# Patient Record
Sex: FEMALE | Race: WHITE | NOT HISPANIC OR LATINO | Employment: OTHER | URBAN - METROPOLITAN AREA
[De-identification: names, ages, dates, MRNs, and addresses within clinical notes are randomized per-mention and may not be internally consistent; named-entity substitution may affect disease eponyms.]

---

## 2023-09-26 ENCOUNTER — TRANSCRIBE ORDERS (OUTPATIENT)
Dept: ADMINISTRATIVE | Facility: HOSPITAL | Age: 88
End: 2023-09-26
Payer: MEDICARE

## 2023-09-26 DIAGNOSIS — S06.5XAD TRAUMATIC SUBDURAL HEMATOMA, SUBSEQUENT ENCOUNTER: Primary | ICD-10-CM

## 2023-09-28 ENCOUNTER — HOSPITAL ENCOUNTER (INPATIENT)
Facility: HOSPITAL | Age: 88
LOS: 2 days | Discharge: NURSING, REHAB OR LONG-TERM CARE | DRG: 085 | End: 2023-09-30
Attending: EMERGENCY MEDICINE | Admitting: INTERNAL MEDICINE
Payer: MEDICARE

## 2023-09-28 ENCOUNTER — APPOINTMENT (OUTPATIENT)
Dept: CT IMAGING | Facility: HOSPITAL | Age: 88
DRG: 085 | End: 2023-09-28
Payer: MEDICARE

## 2023-09-28 ENCOUNTER — APPOINTMENT (OUTPATIENT)
Dept: GENERAL RADIOLOGY | Facility: HOSPITAL | Age: 88
DRG: 085 | End: 2023-09-28
Payer: MEDICARE

## 2023-09-28 DIAGNOSIS — S06.5XAA SUBDURAL HEMATOMA: Primary | ICD-10-CM

## 2023-09-28 LAB
ALBUMIN SERPL-MCNC: 3.1 G/DL (ref 3.5–5.2)
ALBUMIN/GLOB SERPL: 1.2 G/DL
ALP SERPL-CCNC: 132 U/L (ref 39–117)
ALT SERPL W P-5'-P-CCNC: 12 U/L (ref 1–33)
ANION GAP SERPL CALCULATED.3IONS-SCNC: 10 MMOL/L (ref 5–15)
APTT PPP: 27.8 SECONDS (ref 61–76.5)
AST SERPL-CCNC: 15 U/L (ref 1–32)
BASOPHILS # BLD AUTO: 0.1 10*3/MM3 (ref 0–0.2)
BASOPHILS NFR BLD AUTO: 0.8 % (ref 0–1.5)
BILIRUB SERPL-MCNC: 0.5 MG/DL (ref 0–1.2)
BUN SERPL-MCNC: 20 MG/DL (ref 8–23)
BUN/CREAT SERPL: 21.7 (ref 7–25)
CALCIUM SPEC-SCNC: 8 MG/DL (ref 8.6–10.5)
CHLORIDE SERPL-SCNC: 102 MMOL/L (ref 98–107)
CO2 SERPL-SCNC: 25 MMOL/L (ref 22–29)
CREAT SERPL-MCNC: 0.92 MG/DL (ref 0.57–1)
DEPRECATED RDW RBC AUTO: 49 FL (ref 37–54)
EGFRCR SERPLBLD CKD-EPI 2021: 60 ML/MIN/1.73
EOSINOPHIL # BLD AUTO: 0.3 10*3/MM3 (ref 0–0.4)
EOSINOPHIL NFR BLD AUTO: 2.4 % (ref 0.3–6.2)
ERYTHROCYTE [DISTWIDTH] IN BLOOD BY AUTOMATED COUNT: 15.8 % (ref 12.3–15.4)
GLOBULIN UR ELPH-MCNC: 2.6 GM/DL
GLUCOSE SERPL-MCNC: 105 MG/DL (ref 65–99)
HCT VFR BLD AUTO: 33.1 % (ref 34–46.6)
HGB BLD-MCNC: 10.6 G/DL (ref 12–15.9)
HOLD SPECIMEN: NORMAL
INR PPP: 1.12 (ref 0.93–1.1)
LYMPHOCYTES # BLD AUTO: 1.9 10*3/MM3 (ref 0.7–3.1)
LYMPHOCYTES NFR BLD AUTO: 16.3 % (ref 19.6–45.3)
MCH RBC QN AUTO: 28.2 PG (ref 26.6–33)
MCHC RBC AUTO-ENTMCNC: 31.9 G/DL (ref 31.5–35.7)
MCV RBC AUTO: 88.4 FL (ref 79–97)
MONOCYTES # BLD AUTO: 1.3 10*3/MM3 (ref 0.1–0.9)
MONOCYTES NFR BLD AUTO: 11.7 % (ref 5–12)
NEUTROPHILS NFR BLD AUTO: 68.8 % (ref 42.7–76)
NEUTROPHILS NFR BLD AUTO: 7.9 10*3/MM3 (ref 1.7–7)
NRBC BLD AUTO-RTO: 0 /100 WBC (ref 0–0.2)
NT-PROBNP SERPL-MCNC: 9405 PG/ML (ref 0–1800)
PLATELET # BLD AUTO: 225 10*3/MM3 (ref 140–450)
PMV BLD AUTO: 9.7 FL (ref 6–12)
POTASSIUM SERPL-SCNC: 4.6 MMOL/L (ref 3.5–5.2)
PROT SERPL-MCNC: 5.7 G/DL (ref 6–8.5)
PROTHROMBIN TIME: 12.1 SECONDS (ref 9.6–11.7)
RBC # BLD AUTO: 3.74 10*6/MM3 (ref 3.77–5.28)
SODIUM SERPL-SCNC: 137 MMOL/L (ref 136–145)
T4 FREE SERPL-MCNC: 1.41 NG/DL (ref 0.93–1.7)
TROPONIN T SERPL HS-MCNC: 50 NG/L
TSH SERPL DL<=0.05 MIU/L-ACNC: 2.55 UIU/ML (ref 0.27–4.2)
WBC NRBC COR # BLD: 11.5 10*3/MM3 (ref 3.4–10.8)
WHOLE BLOOD HOLD COAG: NORMAL

## 2023-09-28 PROCEDURE — 25010000002 LABETALOL 5 MG/ML SOLUTION: Performed by: EMERGENCY MEDICINE

## 2023-09-28 PROCEDURE — 83880 ASSAY OF NATRIURETIC PEPTIDE: CPT | Performed by: EMERGENCY MEDICINE

## 2023-09-28 PROCEDURE — 84439 ASSAY OF FREE THYROXINE: CPT | Performed by: EMERGENCY MEDICINE

## 2023-09-28 PROCEDURE — 93005 ELECTROCARDIOGRAM TRACING: CPT | Performed by: EMERGENCY MEDICINE

## 2023-09-28 PROCEDURE — 87641 MR-STAPH DNA AMP PROBE: CPT | Performed by: NURSE PRACTITIONER

## 2023-09-28 PROCEDURE — 25010000002 DEXAMETHASONE PER 1 MG: Performed by: NURSE PRACTITIONER

## 2023-09-28 PROCEDURE — 84443 ASSAY THYROID STIM HORMONE: CPT | Performed by: EMERGENCY MEDICINE

## 2023-09-28 PROCEDURE — 99285 EMERGENCY DEPT VISIT HI MDM: CPT

## 2023-09-28 PROCEDURE — 84484 ASSAY OF TROPONIN QUANT: CPT | Performed by: EMERGENCY MEDICINE

## 2023-09-28 PROCEDURE — 71045 X-RAY EXAM CHEST 1 VIEW: CPT

## 2023-09-28 PROCEDURE — 25010000002 LEVETRIRACETAM PER 10 MG: Performed by: NURSE PRACTITIONER

## 2023-09-28 PROCEDURE — 85610 PROTHROMBIN TIME: CPT | Performed by: EMERGENCY MEDICINE

## 2023-09-28 PROCEDURE — 70450 CT HEAD/BRAIN W/O DYE: CPT

## 2023-09-28 PROCEDURE — 85025 COMPLETE CBC W/AUTO DIFF WBC: CPT | Performed by: EMERGENCY MEDICINE

## 2023-09-28 PROCEDURE — 80053 COMPREHEN METABOLIC PANEL: CPT | Performed by: EMERGENCY MEDICINE

## 2023-09-28 PROCEDURE — 85730 THROMBOPLASTIN TIME PARTIAL: CPT | Performed by: EMERGENCY MEDICINE

## 2023-09-28 RX ORDER — HYDRALAZINE HYDROCHLORIDE 25 MG/1
50 TABLET, FILM COATED ORAL 3 TIMES DAILY
COMMUNITY

## 2023-09-28 RX ORDER — CHOLECALCIFEROL (VITAMIN D3) 125 MCG
5 CAPSULE ORAL NIGHTLY PRN
COMMUNITY

## 2023-09-28 RX ORDER — ONDANSETRON 2 MG/ML
4 INJECTION INTRAMUSCULAR; INTRAVENOUS EVERY 6 HOURS PRN
Status: DISCONTINUED | OUTPATIENT
Start: 2023-09-28 | End: 2023-09-30 | Stop reason: HOSPADM

## 2023-09-28 RX ORDER — SODIUM CHLORIDE 0.9 % (FLUSH) 0.9 %
10 SYRINGE (ML) INJECTION AS NEEDED
Status: DISCONTINUED | OUTPATIENT
Start: 2023-09-28 | End: 2023-09-30 | Stop reason: HOSPADM

## 2023-09-28 RX ORDER — FLUOROMETHOLONE 0.1 %
1 SUSPENSION, DROPS(FINAL DOSAGE FORM)(ML) OPHTHALMIC (EYE) DAILY
COMMUNITY

## 2023-09-28 RX ORDER — MECLIZINE HYDROCHLORIDE 25 MG/1
12.5 TABLET ORAL DAILY PRN
COMMUNITY

## 2023-09-28 RX ORDER — LABETALOL HYDROCHLORIDE 5 MG/ML
10 INJECTION, SOLUTION INTRAVENOUS ONCE
Status: COMPLETED | OUTPATIENT
Start: 2023-09-28 | End: 2023-09-28

## 2023-09-28 RX ORDER — NITROGLYCERIN 0.4 MG/1
0.4 TABLET SUBLINGUAL
Status: DISCONTINUED | OUTPATIENT
Start: 2023-09-28 | End: 2023-09-30 | Stop reason: HOSPADM

## 2023-09-28 RX ORDER — SODIUM CHLORIDE 9 MG/ML
40 INJECTION, SOLUTION INTRAVENOUS AS NEEDED
Status: DISCONTINUED | OUTPATIENT
Start: 2023-09-28 | End: 2023-09-30 | Stop reason: HOSPADM

## 2023-09-28 RX ORDER — CALCIUM CARBONATE 500 MG/1
1 TABLET, CHEWABLE ORAL 3 TIMES DAILY PRN
COMMUNITY

## 2023-09-28 RX ORDER — SIMETHICONE 80 MG
80 TABLET,CHEWABLE ORAL 4 TIMES DAILY PRN
COMMUNITY

## 2023-09-28 RX ORDER — IPRATROPIUM BROMIDE AND ALBUTEROL SULFATE 2.5; .5 MG/3ML; MG/3ML
3 SOLUTION RESPIRATORY (INHALATION)
Status: DISCONTINUED | OUTPATIENT
Start: 2023-09-28 | End: 2023-09-30 | Stop reason: HOSPADM

## 2023-09-28 RX ORDER — LOPERAMIDE HYDROCHLORIDE 2 MG/1
2 CAPSULE ORAL 4 TIMES DAILY PRN
COMMUNITY

## 2023-09-28 RX ORDER — ALENDRONATE SODIUM 70 MG/1
70 TABLET ORAL
COMMUNITY

## 2023-09-28 RX ORDER — DEXAMETHASONE SODIUM PHOSPHATE 4 MG/ML
4 INJECTION, SOLUTION INTRA-ARTICULAR; INTRALESIONAL; INTRAMUSCULAR; INTRAVENOUS; SOFT TISSUE EVERY 6 HOURS
Status: DISCONTINUED | OUTPATIENT
Start: 2023-09-28 | End: 2023-09-30 | Stop reason: HOSPADM

## 2023-09-28 RX ORDER — BISACODYL 5 MG/1
5 TABLET, DELAYED RELEASE ORAL DAILY PRN
Status: DISCONTINUED | OUTPATIENT
Start: 2023-09-28 | End: 2023-09-30 | Stop reason: HOSPADM

## 2023-09-28 RX ORDER — BISACODYL 10 MG
10 SUPPOSITORY, RECTAL RECTAL DAILY PRN
Status: DISCONTINUED | OUTPATIENT
Start: 2023-09-28 | End: 2023-09-30 | Stop reason: HOSPADM

## 2023-09-28 RX ORDER — LIDOCAINE 50 MG/G
1 PATCH TOPICAL EVERY 24 HOURS
COMMUNITY

## 2023-09-28 RX ORDER — DIPHENHYDRAMINE HCL 25 MG
12.5 CAPSULE ORAL 3 TIMES DAILY PRN
COMMUNITY

## 2023-09-28 RX ORDER — ACETAMINOPHEN 325 MG/1
650 TABLET ORAL EVERY 6 HOURS PRN
COMMUNITY

## 2023-09-28 RX ORDER — ONDANSETRON 4 MG/1
4 TABLET, FILM COATED ORAL EVERY 4 HOURS PRN
COMMUNITY

## 2023-09-28 RX ORDER — ONDANSETRON 2 MG/ML
4 INJECTION INTRAMUSCULAR; INTRAVENOUS ONCE
Status: DISCONTINUED | OUTPATIENT
Start: 2023-09-28 | End: 2023-09-30 | Stop reason: HOSPADM

## 2023-09-28 RX ORDER — LEVETIRACETAM 500 MG/5ML
500 INJECTION, SOLUTION, CONCENTRATE INTRAVENOUS EVERY 12 HOURS SCHEDULED
Status: DISCONTINUED | OUTPATIENT
Start: 2023-09-28 | End: 2023-09-30 | Stop reason: HOSPADM

## 2023-09-28 RX ORDER — CARVEDILOL 3.12 MG/1
3.12 TABLET ORAL 2 TIMES DAILY WITH MEALS
COMMUNITY

## 2023-09-28 RX ORDER — ALBUTEROL SULFATE 2.5 MG/3ML
2.5 SOLUTION RESPIRATORY (INHALATION) EVERY 6 HOURS PRN
COMMUNITY

## 2023-09-28 RX ORDER — ONDANSETRON 2 MG/ML
4 INJECTION INTRAMUSCULAR; INTRAVENOUS EVERY 4 HOURS PRN
COMMUNITY

## 2023-09-28 RX ORDER — DOCUSATE SODIUM 100 MG/1
100 CAPSULE, LIQUID FILLED ORAL 2 TIMES DAILY
COMMUNITY

## 2023-09-28 RX ORDER — BUDESONIDE 0.5 MG/2ML
0.5 INHALANT ORAL
Status: DISCONTINUED | OUTPATIENT
Start: 2023-09-28 | End: 2023-09-30 | Stop reason: HOSPADM

## 2023-09-28 RX ORDER — SODIUM CHLORIDE 0.9 % (FLUSH) 0.9 %
10 SYRINGE (ML) INJECTION EVERY 12 HOURS SCHEDULED
Status: DISCONTINUED | OUTPATIENT
Start: 2023-09-28 | End: 2023-09-30 | Stop reason: HOSPADM

## 2023-09-28 RX ORDER — ONDANSETRON 4 MG/1
4 TABLET, FILM COATED ORAL EVERY 6 HOURS PRN
Status: DISCONTINUED | OUTPATIENT
Start: 2023-09-28 | End: 2023-09-30 | Stop reason: HOSPADM

## 2023-09-28 RX ORDER — HYDRALAZINE HYDROCHLORIDE 50 MG/1
50 TABLET, FILM COATED ORAL 3 TIMES DAILY
COMMUNITY
End: 2023-09-28

## 2023-09-28 RX ORDER — POLYETHYLENE GLYCOL 3350 17 G/17G
17 POWDER, FOR SOLUTION ORAL DAILY PRN
Status: DISCONTINUED | OUTPATIENT
Start: 2023-09-28 | End: 2023-09-30 | Stop reason: HOSPADM

## 2023-09-28 RX ORDER — ALUMINUM HYDROXIDE, MAGNESIUM HYDROXIDE, AND DIMETHICONE 200; 20; 200 MG/5ML; MG/5ML; MG/5ML
30 SUSPENSION ORAL 3 TIMES DAILY PRN
COMMUNITY

## 2023-09-28 RX ORDER — LIDOCAINE 4 G/G
1 PATCH TOPICAL EVERY 24 HOURS
COMMUNITY
End: 2023-09-28

## 2023-09-28 RX ORDER — AMOXICILLIN 250 MG
2 CAPSULE ORAL 2 TIMES DAILY
Status: DISCONTINUED | OUTPATIENT
Start: 2023-09-28 | End: 2023-09-30 | Stop reason: HOSPADM

## 2023-09-28 RX ORDER — DIPHENHYDRAMINE HYDROCHLORIDE, ZINC ACETATE 2; .1 G/100G; G/100G
1 CREAM TOPICAL 2 TIMES DAILY PRN
COMMUNITY

## 2023-09-28 RX ORDER — BUMETANIDE 1 MG/1
1 TABLET ORAL 2 TIMES DAILY
COMMUNITY

## 2023-09-28 RX ADMIN — DEXAMETHASONE SODIUM PHOSPHATE 4 MG: 4 INJECTION, SOLUTION INTRAMUSCULAR; INTRAVENOUS at 22:15

## 2023-09-28 RX ADMIN — LEVETIRACETAM 500 MG: 100 INJECTION, SOLUTION INTRAVENOUS at 20:24

## 2023-09-28 RX ADMIN — LABETALOL HYDROCHLORIDE 10 MG: 5 INJECTION, SOLUTION INTRAVENOUS at 20:23

## 2023-09-28 RX ADMIN — Medication 10 ML: at 22:15

## 2023-09-28 NOTE — H&P
Critical Care History and Physical     Indira Mcgarry : 1934 MRN:[id number] LOS:0 ROOM:      Reason for admission: SDH (subdural hematoma)     Assessment / Plan     Subdural hematoma  -Following fall 2 weeks ago  -SDH has grown from 12 mm to 20 mm as evidenced by CT this evening  -Right to left midline shift measuring 6 mm  -BP control, SBP <160  -Given IV labetalol in ED  -Will start IV Cardene if needed  -Neurosurgery contacted by ED, will see in a.m.  -Repeat CT in a.m.  -Stat CT for any neurological changes  -IV Keppra  -IV Decadron  -Seizure precautions    Chronic obstructive pulmonary disease  -Per daughter wears 3 L O2 at bedtime  -oxygen supplementation as needed, titrate and wean to maintain oxygen saturations > 91%  -DuoNebs  -Pulmicort    Acute on chronic Diastolic Congestive Heart Failure, with preserved ejection fraction  -Last ECHO completed at [facility] on 2023 with EF 42%  -Patient on carvedilol, currently held 2/2 bradycardia  -Resume home Bumex after verified by pharmacy and clinically appropriate    Essential hypertension  -Resume home hydralazine after verified by pharmacy    Coronary artery disease  -Hold home ASA      Nutrition:   No diet orders on file     DVT prophylaxis:  No DVT prophylaxis order currently exists.     History of Present illness     Indira Mcgarry is a 88 y.o. female with PMH of COPD, CHF, with pacemaker who was recently hospitalized after a fall on 2023 in which she was diagnosed with a subdural hematoma measuring 12 mm, was sent to the hospital from her rehab facility today for syncopal episodes.  Patient denies chest pain, shortness of air, palpitations, headache, chills, or fever.  Denies abdominal pain, nausea, vomiting, diarrhea, constipation, loss of weight or loss of appetite, dysuria, blood in urine or stool.  Of note patient has a midline catheter in right upper arm, in which family states was placed at her rehab facility due to being  dehydrated and needing fluids.    ED course: CT head showed increase in her right cerebral convexity subdural hematoma, now measuring approximately 20 mm. There was also associated mass effect with right to left midline shift measuring approximately 6 mm, which had also increased since 9/11.  Other significant lab work showed BNP 9405, calcium 8.0, albumin 3.1, WBC 11.50, PLT 33.1.    ACP: Patient does not have advanced active on file at this facility.  She is currently alert and oriented x4.  Her daughter, Antonieta is at bedside and states that she is a full code.  There are 4 children,  who share in decision making as there is no POA paperwork naming healthcare surrogate.    Patient was seen and examined on 09/28/23 at 19:41 EDT .    Subjective / Review of systems     Review of Systems   Constitutional:  Negative for chills and fever.   HENT:  Negative for congestion and sore throat.    Respiratory:  Negative for cough and shortness of breath.    Cardiovascular:  Negative for chest pain and palpitations.   Gastrointestinal:  Negative for abdominal pain and nausea.   Endocrine: Negative for heat intolerance and polyuria.   Genitourinary:  Negative for dysuria and urgency.   Musculoskeletal:  Negative for arthralgias and myalgias.   Skin:  Negative for rash and wound.   Neurological:  Positive for dizziness and light-headedness. Negative for weakness and numbness.   Psychiatric/Behavioral:  Negative for suicidal ideas. The patient is not nervous/anxious.       Past Medical/Surgical/Social/Family History & Allergies     Past Medical History:   Diagnosis Date    CHF (congestive heart failure)     COPD (chronic obstructive pulmonary disease)     Pacemaker     Ulcerative colitis       History reviewed. No pertinent surgical history.   Social History     Socioeconomic History    Marital status:    Tobacco Use    Smoking status: Never   Substance and Sexual Activity    Alcohol use: Not Currently    Drug use: Not  Currently      History reviewed. No pertinent family history.   Allergies   Allergen Reactions    Macrobid [Nitrofurantoin] Hives    Penicillins Hives      Social Determinants of Health     Tobacco Use: Unknown    Smoking Tobacco Use: Never    Smokeless Tobacco Use: Unknown    Passive Exposure: Not on file   Alcohol Use: Not on file   Financial Resource Strain: Not on file   Food Insecurity: Not on file   Transportation Needs: Not on file   Physical Activity: Not on file   Stress: Not on file   Social Connections: Not on file   Intimate Partner Violence: Not on file   Depression: Not on file   Housing Stability: Not on file        Home Medications     Prior to Admission medications    Medication Sig Start Date End Date Taking? Authorizing Provider   alendronate (FOSAMAX) 70 MG tablet Take 1 tablet by mouth Every 7 (Seven) Days.    Lm Aparicio MD   brimonidine (ALPHAGAN P) 0.1 % solution ophthalmic solution Every 8 (Eight) Hours.    Lm Aparicio MD   carvedilol (COREG) 3.125 MG tablet Take 1 tablet by mouth 2 (Two) Times a Day With Meals.    Lm Aparicio MD   hydrALAZINE (APRESOLINE) 50 MG tablet Take 1 tablet by mouth 3 (Three) Times a Day.    Lm Aparicio MD   Lidocaine 4 % Place 1 patch on the skin as directed by provider Daily. Remove & Discard patch within 12 hours or as directed by MD    Lm Aparicio MD        Objective / Physical Exam     Vital signs:  Temp: 98.7 °F (37.1 °C)  BP: 158/58  Heart Rate: 65  Resp: 19  SpO2: 91 %  Weight: 90.7 kg (200 lb)    Admission Weight: Weight: 90.7 kg (200 lb)    Physical Exam  Constitutional:       Appearance: Normal appearance. She is normal weight.   HENT:      Head: Normocephalic.      Nose: Nose normal.      Mouth/Throat:      Mouth: Mucous membranes are moist.   Eyes:      Extraocular Movements: Extraocular movements intact.      Pupils: Pupils are equal, round, and reactive to light.   Cardiovascular:      Rate and Rhythm:  Regular rhythm. Bradycardia present.      Pulses: Normal pulses.      Heart sounds: Normal heart sounds.   Pulmonary:      Effort: Pulmonary effort is normal.      Breath sounds: Decreased air movement present.   Abdominal:      General: Bowel sounds are normal.      Palpations: Abdomen is soft.   Musculoskeletal:         General: Normal range of motion.   Skin:     General: Skin is warm and dry.      Coloration: Skin is pale.   Neurological:      General: No focal deficit present.      Mental Status: She is alert.      Comments: No notable neurological deficits at time of assessment.  Patient able to follow commands.  Alert and oriented x4.  Participates in discussion.  Moves all extremities.  No weakness, no numbness, no tingling.  No visual deficits.   Psychiatric:         Mood and Affect: Mood normal.         Behavior: Behavior normal.        Labs     Results from last 7 days   Lab Units 09/28/23  1722 09/27/23  0450 09/25/23  0430 09/23/23  0500   WBC 10*3/mm3 11.50* 7.60 7.10 7.60   HEMATOCRIT % 33.1* 28.9* 30.1* 30.9*   PLATELETS 10*3/mm3 225 195 225 274      Results from last 7 days   Lab Units 09/28/23  1722 09/28/23  0440 09/27/23  0450 09/25/23  0430 09/24/23  0530 09/23/23  0500   SODIUM mmol/L 137 137 136 134*  --  133*   POTASSIUM mmol/L 4.6 4.9 4.6 5.0 5.1 5.4*   CHLORIDE mmol/L 102 105 104 101  --  100   CO2 mmol/L 25.0 26.0 24.0 26.0  --  26.0   BUN mg/dL 20 21 26* 34*  --  40*   CREATININE mg/dL 0.92 0.93 1.28* 1.49*  --  1.46*        Imaging     EKG: My independent evaluation showed normal sinus rhythm, LBBB, no ST -T changes    Current Medications     Scheduled Meds:  labetalol, 10 mg, Intravenous, Once  ondansetron, 4 mg, Intravenous, Once         Continuous Infusions:        Patient continues to be critically ill, remains at risk of clinical deterioration or death and needed high complexity decision making. I have spent a total of 40 minutes providing critical care services to this patient  including but not limited to: review of labs/ microbiology/imaging/medications, serial monitoring of vital signs,  review of other consultant's notes, review of events in the last 24 hrs, monitoring input/output, review of treatment plan with bedside nurse, RT and other treatment team, management of life support and nutrition needs. I also spoke with patient daughterAntonieta about the plan of care and answered all questions.    Time spent in performing separately billable procedures and updating family is not included in the critical care time.     KAYLEN Singh   Critical Care  09/28/23   19:41 EDT

## 2023-09-28 NOTE — ED PROVIDER NOTES
"Subjective   History of Present Illness  88-year-old female with history of COPD, CHF, pacemaker with recent fall presents after 2 near syncopal episodes earlier today.  Unclear if she hit her head.  Patient does not remember.  Alert and oriented x3 here.  States she has pain all over and that she has enough with arthritis for her pain is unchanged from her baseline.      Review of Systems  Positive for near syncopal episode.  Past Medical History:   Diagnosis Date    CHF (congestive heart failure)     COPD (chronic obstructive pulmonary disease)     Hypertension     Pacemaker     Ulcerative colitis        Allergies   Allergen Reactions    Macrobid [Nitrofurantoin] Hives    Penicillins Hives       Past Surgical History:   Procedure Laterality Date    CARDIAC CATHETERIZATION      EYE SURGERY      JOINT REPLACEMENT         History reviewed. No pertinent family history.    Social History     Socioeconomic History    Marital status:    Tobacco Use    Smoking status: Never    Smokeless tobacco: Never   Vaping Use    Vaping Use: Never used   Substance and Sexual Activity    Alcohol use: Not Currently    Drug use: Not Currently    Sexual activity: Defer           Objective   Physical Exam  Constitutional:  No acute distress.  Head:  Atraumatic.  Normocephalic.   Eyes:  No scleral icterus. Normal conjunctivae  ENT:  Moist mucosa.  No nasal discharge present.  Cardiovascular:  Well perfused.  Equal pulses.  Regular rhythm and normal rate.  Normal capillary refill.    Pulmonary/Chest:  No respiratory distress.  Airway patent.  No tachypnea.  No accessory muscle usage.  Clear to station bilateral  Abdominal:  Nondistended. Nontender.   Extremities:  No peripheral edema.  No Deformity  Skin:  Warm, dry  Neurological:  Alert, awake, and appropriate.  Normal speech.  Global weakness    Procedures           ED Course    /57   Pulse 61   Temp 98.2 °F (36.8 °C) (Oral)   Resp 19   Ht 160 cm (63\")   Wt 83.3 kg (183 " lb 10.3 oz)   SpO2 96%   BMI 32.53 kg/m²   Labs Reviewed   COMPREHENSIVE METABOLIC PANEL - Abnormal; Notable for the following components:       Result Value    Glucose 105 (*)     Calcium 8.0 (*)     Total Protein 5.7 (*)     Albumin 3.1 (*)     Alkaline Phosphatase 132 (*)     eGFR 60.0 (*)     All other components within normal limits    Narrative:     GFR Normal >60  Chronic Kidney Disease <60  Kidney Failure <15    The GFR formula is only valid for adults with stable renal function between ages 18 and 70.   BNP (IN-HOUSE) - Abnormal; Notable for the following components:    proBNP 9,405.0 (*)     All other components within normal limits    Narrative:     This assay is used as an aid in the diagnosis of individuals suspected of having heart failure. It can be used as an aid in the diagnosis of acute decompensated heart failure (ADHF) in patients presenting with signs and symptoms of ADHF to the emergency department (ED). In addition, NT-proBNP of <300 pg/mL indicates ADHF is not likely.   SINGLE HSTROPONIN T - Abnormal; Notable for the following components:    HS Troponin T 50 (*)     All other components within normal limits    Narrative:     High Sensitive Troponin T Reference Range:  <10.0 ng/L- Negative Female for AMI  <15.0 ng/L- Negative Male for AMI  >=10 - Abnormal Female indicating possible myocardial injury.  >=15 - Abnormal Male indicating possible myocardial injury.   Clinicians would have to utilize clinical acumen, EKG, Troponin, and serial changes to determine if it is an Acute Myocardial Infarction or myocardial injury due to an underlying chronic condition.        CBC WITH AUTO DIFFERENTIAL - Abnormal; Notable for the following components:    WBC 11.50 (*)     RBC 3.74 (*)     Hemoglobin 10.6 (*)     Hematocrit 33.1 (*)     RDW 15.8 (*)     Lymphocyte % 16.3 (*)     Neutrophils, Absolute 7.90 (*)     Monocytes, Absolute 1.30 (*)     All other components within normal limits   PROTIME-INR -  Abnormal; Notable for the following components:    Protime 12.1 (*)     INR 1.12 (*)     All other components within normal limits   APTT - Abnormal; Notable for the following components:    PTT 27.8 (*)     All other components within normal limits   MRSA SCREEN, PCR - Normal    Narrative:     The negative predictive value of this diagnostic test is high and should only be used to consider de-escalating anti-MRSA therapy. A positive result may indicate colonization with MRSA and must be correlated clinically.   TSH - Normal   T4, FREE - Normal    Narrative:     Results may be falsely increased if patient taking Biotin.     MAGNESIUM   PHOSPHORUS   COMPREHENSIVE METABOLIC PANEL   CBC WITH AUTO DIFFERENTIAL   CBC AND DIFFERENTIAL    Narrative:     The following orders were created for panel order CBC & Differential.  Procedure                               Abnormality         Status                     ---------                               -----------         ------                     CBC Auto Differential[[id number]]        Abnormal            Final result                 Please view results for these tests on the individual orders.   EXTRA TUBES    Narrative:     The following orders were created for panel order Extra Tubes.  Procedure                               Abnormality         Status                     ---------                               -----------         ------                     Gold Top - SST[[id number]]                                   Final result               Light Blue Top[[id number]]                                   Final result                 Please view results for these tests on the individual orders.   GOLD TOP - SST   LIGHT BLUE TOP   CBC AND DIFFERENTIAL    Narrative:     The following orders were created for panel order CBC & Differential.  Procedure                               Abnormality         Status                     ---------                               -----------          ------                     CBC Auto Differential[[id number]]                                                         Please view results for these tests on the individual orders.     Medications   sodium chloride 0.9 % flush 10 mL (has no administration in time range)   ondansetron (ZOFRAN) injection 4 mg (4 mg Intravenous Not Given 9/28/23 2010)   nitroglycerin (NITROSTAT) SL tablet 0.4 mg (has no administration in time range)   sodium chloride 0.9 % flush 10 mL (10 mL Intravenous Given 9/28/23 2215)   sodium chloride 0.9 % flush 10 mL (has no administration in time range)   sodium chloride 0.9 % infusion 40 mL (has no administration in time range)   sennosides-docusate (PERICOLACE) 8.6-50 MG per tablet 2 tablet (2 tablets Oral Not Given 9/28/23 2208)     And   polyethylene glycol (MIRALAX) packet 17 g (has no administration in time range)     And   bisacodyl (DULCOLAX) EC tablet 5 mg (has no administration in time range)     And   bisacodyl (DULCOLAX) suppository 10 mg (has no administration in time range)   ondansetron (ZOFRAN) tablet 4 mg (has no administration in time range)     Or   ondansetron (ZOFRAN) injection 4 mg (has no administration in time range)   niCARdipine (CARDENE) 25mg in 250mL NS infusion (has no administration in time range)   levETIRAcetam (KEPPRA) injection 500 mg (500 mg Intravenous Given 9/28/23 2024)   dexAMETHasone (DECADRON) injection 4 mg (4 mg Intravenous Given 9/28/23 2215)   ipratropium-albuterol (DUO-NEB) nebulizer solution 3 mL (3 mL Nebulization Not Given 9/28/23 2056)   budesonide (PULMICORT) nebulizer solution 0.5 mg (0.5 mg Nebulization Not Given 9/28/23 2055)   labetalol (NORMODYNE,TRANDATE) injection 10 mg (10 mg Intravenous Given 9/28/23 2023)     CT Head Without Contrast    Result Date: 9/28/2023  Right cerebral convexity subdural hematoma is predominantly hypodense with scattered isodense components. This now measures approximately 20 mm in thickness (previously  measured up to 12 mm on 9/11/2023). There is associated mass effect with right to left midline shift measuring approximately 6 mm as measured on series 3, image 27. Midline shift appears slightly increased since 9/11/2023. No findings to suggest uncal/transtentorial herniation. Additional findings compatible with chronic microvascular ischemic change and diffuse cortical atrophy. Electronically Signed: Marco Antonio Vinson MD  9/28/2023 6:15 PM EDT  Workstation ID: [id number]    XR Chest 1 View    Result Date: 9/28/2023  Impression: Right upper extremity catheter appears to terminate along the right upper chest consistent with a midline catheter. Cardiomegaly. No evidence of acute cardiopulmonary process. Electronically Signed: Bobo Nunez MD  9/28/2023 9:38 PM EDT  Workstation ID: [id number]                                          Medical Decision Making  Problems Addressed:  Subdural hematoma: complicated acute illness or injury    Amount and/or Complexity of Data Reviewed  Labs: ordered.  Radiology: ordered.  ECG/medicine tests: ordered.    Risk  Prescription drug management.  Decision regarding hospitalization.      Critical Care Time     The high probability of sudden, clinically significant deterioration in the patient's condition required the highest level of my preparedness to intervene urgently.  The services I provided to this patient were to treat and/or prevent clinically significant deterioration that could result in: Neurologic collapse and death. Services included the following: chart data review, reviewing nurses notes and/or old charts, documentation time, consultant collaboration regarding findings and treatment options, vital sign assessments and ordering, interpreting and reviewing diagnostic studies/lab tests.  Aggregate critical care time was 33 minutes, which includes only time during which I was engaged in work directly related to the patient's care, as described above, whether at the bedside  or elsewhere in the Emergency Department. It did not include time spent performing other reported procedures or the services of residents, students, nurses or physician assistants.      EKG # 1  Signed and interpreted by the EP.  Time Interpreted: 4:32 PM  Rate: 66  Rhythm: Normal sinus rhythm  Axis: Normal axis  Intervals: Left bundle branch block  ST Segments: Does not meet modified Sgarbossa criteria     Discussed with radiologist Dr. Vinson.  States it does not appear to be an acute bleed necessarily but does appear significantly expanded from prior.  Discussed with Dr. Kruse.  Recommends overnight observation in the ICU and repeat CT in the a.m.    Final diagnoses:   Subdural hematoma       ED Disposition  ED Disposition       ED Disposition   Decision to Admit    Condition   --    Comment   Level of Care: Critical Care [6]   Diagnosis: SDH (subdural hematoma) [424015]   Admitting Physician: JOSEFINA MELARA [[id number]]   Certification: I Certify That Inpatient Hospital Services Are Medically Necessary For Greater Than 2 Midnights                 No follow-up provider specified.       Medication List        ASK your doctor about these medications      hydrALAZINE 25 MG tablet  Commonly known as: APRESOLINE  Ask about: Which instructions should I use?     lidocaine 5 %  Commonly known as: LIDODERM  Ask about: Which instructions should I use?                 Artemio Brooke MD  09/29/23 0056

## 2023-09-29 ENCOUNTER — APPOINTMENT (OUTPATIENT)
Dept: CT IMAGING | Facility: HOSPITAL | Age: 88
DRG: 085 | End: 2023-09-29
Payer: MEDICARE

## 2023-09-29 LAB
ALBUMIN SERPL-MCNC: 2.8 G/DL (ref 3.5–5.2)
ALBUMIN/GLOB SERPL: 1.2 G/DL
ALP SERPL-CCNC: 126 U/L (ref 39–117)
ALT SERPL W P-5'-P-CCNC: 5 U/L (ref 1–33)
ANION GAP SERPL CALCULATED.3IONS-SCNC: 5 MMOL/L (ref 5–15)
AST SERPL-CCNC: 11 U/L (ref 1–32)
BASOPHILS # BLD AUTO: 0.1 10*3/MM3 (ref 0–0.2)
BASOPHILS NFR BLD AUTO: 1.1 % (ref 0–1.5)
BILIRUB SERPL-MCNC: 0.5 MG/DL (ref 0–1.2)
BUN SERPL-MCNC: 18 MG/DL (ref 8–23)
BUN/CREAT SERPL: 20.7 (ref 7–25)
CALCIUM SPEC-SCNC: 7.9 MG/DL (ref 8.6–10.5)
CHLORIDE SERPL-SCNC: 103 MMOL/L (ref 98–107)
CO2 SERPL-SCNC: 29 MMOL/L (ref 22–29)
CREAT SERPL-MCNC: 0.87 MG/DL (ref 0.57–1)
DEPRECATED RDW RBC AUTO: 47.3 FL (ref 37–54)
EGFRCR SERPLBLD CKD-EPI 2021: 64.2 ML/MIN/1.73
EOSINOPHIL # BLD AUTO: 0 10*3/MM3 (ref 0–0.4)
EOSINOPHIL NFR BLD AUTO: 0.1 % (ref 0.3–6.2)
ERYTHROCYTE [DISTWIDTH] IN BLOOD BY AUTOMATED COUNT: 15.5 % (ref 12.3–15.4)
GLOBULIN UR ELPH-MCNC: 2.3 GM/DL
GLUCOSE SERPL-MCNC: 120 MG/DL (ref 65–99)
HCT VFR BLD AUTO: 30.6 % (ref 34–46.6)
HGB BLD-MCNC: 9.9 G/DL (ref 12–15.9)
LYMPHOCYTES # BLD AUTO: 0.9 10*3/MM3 (ref 0.7–3.1)
LYMPHOCYTES NFR BLD AUTO: 14.8 % (ref 19.6–45.3)
MAGNESIUM SERPL-MCNC: 1.8 MG/DL (ref 1.6–2.4)
MCH RBC QN AUTO: 28.1 PG (ref 26.6–33)
MCHC RBC AUTO-ENTMCNC: 32.4 G/DL (ref 31.5–35.7)
MCV RBC AUTO: 86.9 FL (ref 79–97)
MONOCYTES # BLD AUTO: 0.1 10*3/MM3 (ref 0.1–0.9)
MONOCYTES NFR BLD AUTO: 1.8 % (ref 5–12)
MRSA DNA SPEC QL NAA+PROBE: NORMAL
NEUTROPHILS NFR BLD AUTO: 4.7 10*3/MM3 (ref 1.7–7)
NEUTROPHILS NFR BLD AUTO: 82.2 % (ref 42.7–76)
NRBC BLD AUTO-RTO: 0 /100 WBC (ref 0–0.2)
PHOSPHATE SERPL-MCNC: 4 MG/DL (ref 2.5–4.5)
PLATELET # BLD AUTO: 213 10*3/MM3 (ref 140–450)
PMV BLD AUTO: 9.7 FL (ref 6–12)
POTASSIUM SERPL-SCNC: 4.8 MMOL/L (ref 3.5–5.2)
PROT SERPL-MCNC: 5.1 G/DL (ref 6–8.5)
QT INTERVAL: 461 MS
QTC INTERVAL: 483 MS
RBC # BLD AUTO: 3.52 10*6/MM3 (ref 3.77–5.28)
SODIUM SERPL-SCNC: 137 MMOL/L (ref 136–145)
WBC NRBC COR # BLD: 5.8 10*3/MM3 (ref 3.4–10.8)

## 2023-09-29 PROCEDURE — 84100 ASSAY OF PHOSPHORUS: CPT | Performed by: NURSE PRACTITIONER

## 2023-09-29 PROCEDURE — 25010000002 DEXAMETHASONE PER 1 MG: Performed by: NURSE PRACTITIONER

## 2023-09-29 PROCEDURE — 70450 CT HEAD/BRAIN W/O DYE: CPT

## 2023-09-29 PROCEDURE — 85025 COMPLETE CBC W/AUTO DIFF WBC: CPT | Performed by: NURSE PRACTITIONER

## 2023-09-29 PROCEDURE — 94799 UNLISTED PULMONARY SVC/PX: CPT

## 2023-09-29 PROCEDURE — 94664 DEMO&/EVAL PT USE INHALER: CPT

## 2023-09-29 PROCEDURE — 25010000002 LEVETRIRACETAM PER 10 MG: Performed by: NURSE PRACTITIONER

## 2023-09-29 PROCEDURE — 99222 1ST HOSP IP/OBS MODERATE 55: CPT | Performed by: NURSE PRACTITIONER

## 2023-09-29 PROCEDURE — 80053 COMPREHEN METABOLIC PANEL: CPT | Performed by: NURSE PRACTITIONER

## 2023-09-29 PROCEDURE — 83735 ASSAY OF MAGNESIUM: CPT | Performed by: NURSE PRACTITIONER

## 2023-09-29 PROCEDURE — 25010000002 HYDRALAZINE PER 20 MG: Performed by: INTERNAL MEDICINE

## 2023-09-29 PROCEDURE — 94761 N-INVAS EAR/PLS OXIMETRY MLT: CPT

## 2023-09-29 RX ORDER — HYDRALAZINE HYDROCHLORIDE 25 MG/1
50 TABLET, FILM COATED ORAL 3 TIMES DAILY
Status: DISCONTINUED | OUTPATIENT
Start: 2023-09-29 | End: 2023-09-30 | Stop reason: HOSPADM

## 2023-09-29 RX ORDER — BUMETANIDE 1 MG/1
1 TABLET ORAL
Status: DISCONTINUED | OUTPATIENT
Start: 2023-09-29 | End: 2023-09-30 | Stop reason: HOSPADM

## 2023-09-29 RX ORDER — HYDRALAZINE HYDROCHLORIDE 20 MG/ML
10 INJECTION INTRAMUSCULAR; INTRAVENOUS EVERY 4 HOURS PRN
Status: DISCONTINUED | OUTPATIENT
Start: 2023-09-29 | End: 2023-09-30 | Stop reason: HOSPADM

## 2023-09-29 RX ORDER — CARVEDILOL 6.25 MG/1
6.25 TABLET ORAL 2 TIMES DAILY WITH MEALS
Status: DISCONTINUED | OUTPATIENT
Start: 2023-09-29 | End: 2023-09-30 | Stop reason: HOSPADM

## 2023-09-29 RX ORDER — CHOLECALCIFEROL (VITAMIN D3) 125 MCG
5 CAPSULE ORAL NIGHTLY PRN
Status: DISCONTINUED | OUTPATIENT
Start: 2023-09-29 | End: 2023-09-30 | Stop reason: HOSPADM

## 2023-09-29 RX ORDER — FLUOROMETHOLONE 0.1 %
1 SUSPENSION, DROPS(FINAL DOSAGE FORM)(ML) OPHTHALMIC (EYE) DAILY
Status: DISCONTINUED | OUTPATIENT
Start: 2023-09-29 | End: 2023-09-30 | Stop reason: HOSPADM

## 2023-09-29 RX ORDER — DOCUSATE SODIUM 100 MG/1
100 CAPSULE, LIQUID FILLED ORAL 2 TIMES DAILY
Status: DISCONTINUED | OUTPATIENT
Start: 2023-09-29 | End: 2023-09-30 | Stop reason: HOSPADM

## 2023-09-29 RX ORDER — BRIMONIDINE TARTRATE 2 MG/ML
1 SOLUTION/ DROPS OPHTHALMIC EVERY 8 HOURS SCHEDULED
Status: DISCONTINUED | OUTPATIENT
Start: 2023-09-29 | End: 2023-09-30 | Stop reason: HOSPADM

## 2023-09-29 RX ORDER — BUTALBITAL, ACETAMINOPHEN AND CAFFEINE 50; 325; 40 MG/1; MG/1; MG/1
2 TABLET ORAL EVERY 4 HOURS PRN
Status: DISCONTINUED | OUTPATIENT
Start: 2023-09-29 | End: 2023-09-30 | Stop reason: HOSPADM

## 2023-09-29 RX ADMIN — BRIMONIDINE TARTRATE 1 DROP: 2 SOLUTION/ DROPS OPHTHALMIC at 21:57

## 2023-09-29 RX ADMIN — BRIMONIDINE TARTRATE 1 DROP: 2 SOLUTION/ DROPS OPHTHALMIC at 16:35

## 2023-09-29 RX ADMIN — FLUOROMETHOLONE 1 DROP: 1 SOLUTION/ DROPS OPHTHALMIC at 16:35

## 2023-09-29 RX ADMIN — NICARDIPINE HYDROCHLORIDE 5 MG/HR: 25 INJECTION, SOLUTION INTRAVENOUS at 05:12

## 2023-09-29 RX ADMIN — HYDRALAZINE HYDROCHLORIDE 10 MG: 20 INJECTION INTRAMUSCULAR; INTRAVENOUS at 09:11

## 2023-09-29 RX ADMIN — Medication 10 ML: at 03:34

## 2023-09-29 RX ADMIN — BUMETANIDE 1 MG: 1 TABLET ORAL at 09:03

## 2023-09-29 RX ADMIN — LEVETIRACETAM 500 MG: 100 INJECTION, SOLUTION INTRAVENOUS at 21:00

## 2023-09-29 RX ADMIN — DEXAMETHASONE SODIUM PHOSPHATE 4 MG: 4 INJECTION, SOLUTION INTRAMUSCULAR; INTRAVENOUS at 21:57

## 2023-09-29 RX ADMIN — Medication 10 ML: at 21:03

## 2023-09-29 RX ADMIN — DEXAMETHASONE SODIUM PHOSPHATE 4 MG: 4 INJECTION, SOLUTION INTRAMUSCULAR; INTRAVENOUS at 09:03

## 2023-09-29 RX ADMIN — IPRATROPIUM BROMIDE AND ALBUTEROL SULFATE 3 ML: .5; 3 SOLUTION RESPIRATORY (INHALATION) at 07:15

## 2023-09-29 RX ADMIN — IPRATROPIUM BROMIDE AND ALBUTEROL SULFATE 3 ML: .5; 3 SOLUTION RESPIRATORY (INHALATION) at 20:30

## 2023-09-29 RX ADMIN — DEXAMETHASONE SODIUM PHOSPHATE 4 MG: 4 INJECTION, SOLUTION INTRAMUSCULAR; INTRAVENOUS at 03:34

## 2023-09-29 RX ADMIN — SENNOSIDES AND DOCUSATE SODIUM 2 TABLET: 50; 8.6 TABLET ORAL at 09:03

## 2023-09-29 RX ADMIN — LEVETIRACETAM 500 MG: 100 INJECTION, SOLUTION INTRAVENOUS at 08:41

## 2023-09-29 RX ADMIN — IPRATROPIUM BROMIDE AND ALBUTEROL SULFATE 3 ML: .5; 3 SOLUTION RESPIRATORY (INHALATION) at 14:43

## 2023-09-29 RX ADMIN — BUDESONIDE 0.5 MG: 0.5 INHALANT RESPIRATORY (INHALATION) at 20:34

## 2023-09-29 RX ADMIN — DEXAMETHASONE SODIUM PHOSPHATE 4 MG: 4 INJECTION, SOLUTION INTRAMUSCULAR; INTRAVENOUS at 15:41

## 2023-09-29 RX ADMIN — BUDESONIDE 0.5 MG: 0.5 INHALANT RESPIRATORY (INHALATION) at 07:20

## 2023-09-29 RX ADMIN — HYDRALAZINE HYDROCHLORIDE 10 MG: 20 INJECTION INTRAMUSCULAR; INTRAVENOUS at 14:21

## 2023-09-29 RX ADMIN — BUMETANIDE 1 MG: 1 TABLET ORAL at 17:27

## 2023-09-29 RX ADMIN — CARVEDILOL 6.25 MG: 6.25 TABLET, FILM COATED ORAL at 09:03

## 2023-09-29 RX ADMIN — HYDRALAZINE HYDROCHLORIDE 50 MG: 25 TABLET, FILM COATED ORAL at 21:00

## 2023-09-29 RX ADMIN — CARVEDILOL 6.25 MG: 6.25 TABLET, FILM COATED ORAL at 17:27

## 2023-09-29 RX ADMIN — Medication 10 ML: at 08:41

## 2023-09-29 NOTE — PROGRESS NOTES
Critical Care Progress Note   Indira Mcgarry : 1934 MRN:[id number] LOS:1     Principal Problem: SDH (subdural hematoma)     Reason for follow up: All the medical problems listed below    Summary     88 y.o. female with PMH of COPD, CHF, with pacemaker who was recently hospitalized after a fall on 2023 in which she was diagnosed with a subdural hematoma measuring 12 mm, was sent to the hospital from her rehab facility today for syncopal episodes.  Patient denies chest pain, shortness of air, palpitations, headache, chills, or fever.  Denies abdominal pain, nausea, vomiting, diarrhea, constipation, loss of weight or loss of appetite, dysuria, blood in urine or stool.  Of note patient has a midline catheter in right upper arm, in which family states was placed at her rehab facility due to being dehydrated and needing fluids.     ED course: CT head showed increase in her right cerebral convexity subdural hematoma, now measuring approximately 20 mm. There was also associated mass effect with right to left midline shift measuring approximately 6 mm, which had also increased since .  Other significant lab work showed BNP 9405, calcium 8.0, albumin 3.1, WBC 11.50, PLT 33.1.     ACP: Patient does not have advanced active on file at this facility.  She is currently alert and oriented x4.  Her daughter, Antonieta is at bedside and states that she is a full code.  There are 4 children,  who share in decision making as there is no POA paperwork naming healthcare surrogate.    Significant events     23 : CT stable this am.  Awaiting NSR to see pt.  Labs reviewed.    Assessment / Plan     Subdural hematoma on R  Headache  Same-level fall  -Following fall 2 weeks ago  -SDH has grown from 12 mm to 20 mm as evidenced by CT on   -Right to left midline shift measuring 6 mm  -BP control, SBP <160  -Given IV labetalol in ED  -On IV Cardene this morning, but has been weaned off.  Increased Coreg to 6.25 mg po bid.   Add prn Hydralazine to keep BP under goal.  -Neurosurgery contacted by ED--awaiting recommendations  -Repeat CT this am without change in bleed  -Stat CT for any neurological changes  -IV Keppra  -IV Decadron  -Seizure precautions  -Fall precautions  -PRN Fioricet for H/A       Chronic obstructive pulmonary disease  -Per daughter wears 3 L O2 at bedtime  -oxygen supplementation as needed, titrate and wean to maintain oxygen saturations > 91%  -DuoNebs  -Pulmicort       Acute on chronic Diastolic Congestive Heart Failure, with preserved ejection fraction  -Last ECHO completed at [facility] on 9/12/2023 with EF 42%  -Patient on carvedilol, increased to 6.25 mg po bid  -Resume home Bumex       Essential hypertension  -Resume home hydralazine after verified by pharmacy       Coronary artery disease  -Hold home ASA        Dispo:  If NSR planning no intervention, down-grade to KAYODE today.      Code status:   Code Status (Patient has no pulse and is not breathing): CPR (Attempt to Resuscitate)  Medical Interventions (Patient has pulse or is breathing): Full Support       Nutrition: NPO Diet NPO Type: Strict NPO   Patient isn't on Tube Feeding    DVT prophylaxis:  Mechanical DVT prophylaxis orders are present.     Subjective / Review of systems     Review of Systems   Pt complains of significant H/A on R side and of being hungry.  Denies CP or SOB.  Denies f/c/s/n/v.  Objective / Physical Exam   Vital signs:  Temp: 97.5 °F (36.4 °C)  BP: 139/58  Heart Rate: 60  Resp: 15  SpO2: 96 %  Weight: 83.3 kg (183 lb 10.3 oz)    Admission Weight: Weight: 90.7 kg (200 lb)  Current Weight: Weight: 83.3 kg (183 lb 10.3 oz)    Input/Output in last 24 hours:    Intake/Output Summary (Last 24 hours) at 9/29/2023 1152  Last data filed at 9/29/2023 1000  Gross per 24 hour   Intake 88 ml   Output 400 ml   Net -312 ml      Physical Exam     GEN:  Pleasant, elderly woman.  Appears appropriate for stated age.  WD/WN/WH.  Lying in bed on  RA.  Appears weak and in pain.  NEURO:  Brainstem reflexes intact.  Moves all 4 ext.  Slightly weaker in R arm than left, 4/5, RLE 5/5.  Possibly some mild R mouth droop.    HEENT:  N/AT.  PERRL.  MMM.  Oropharynx non-erythematous.  No drainage from the eyes/ears/nose.  No conjunctival petechiae.  No oral thrush.  Auditory and visual acuity grossly wnl.  Voice normal.  NECK:  Supple, NT, trachea midline.  No meningismus.  No ROM limitation.    CHEST/LUNGS:  Breath sounds are clear and equal bilaterally.  No w/r/r.  Chest excursion equal bilaterally.    CARDIOVASCULAR:  RRR w/o murmur noted.    GI:  Abdomen soft, NT, ND, +BS.    :  Deferred.  EXTREMITIES:  No deformity or amputation.  No cyanosis, edema, or asymmetry.  Pulses 2+ and equal in BLE's.    SKIN:  Warm, dry, and pink.  No rash, breakdown, or track marks noted.  LYMPHATICS/HEME:  No overt LAD or abnormal bruising.  No lymphedema.  MSK:  Normal ROM.  No joint abnormalities noted.  Strength is as above.  PSYCH:  Pleasant.  A&Ox 3.  Normal mood and affect.  Responds appropriately to commands and appears to comprehend instructions.          Radiology and Labs     Results from last 7 days   Lab Units 09/29/23  0506 09/28/23  1722 09/27/23  0450 09/25/23  0430 09/23/23  0500   WBC 10*3/mm3 5.80 11.50* 7.60 7.10 7.60   HEMATOCRIT % 30.6* 33.1* 28.9* 30.1* 30.9*   PLATELETS 10*3/mm3 213 225 195 225 274      Results from last 7 days   Lab Units 09/29/23  0506 09/28/23  1722 09/28/23  0440 09/27/23  0450 09/25/23  0430   SODIUM mmol/L 137 137 137 136 134*   POTASSIUM mmol/L 4.8 4.6 4.9 4.6 5.0   CHLORIDE mmol/L 103 102 105 104 101   CO2 mmol/L 29.0 25.0 26.0 24.0 26.0   BUN mg/dL 18 20 21 26* 34*   CREATININE mg/dL 0.87 0.92 0.93 1.28* 1.49*      Current medications   Scheduled Meds: budesonide, 0.5 mg, Nebulization, BID - RT  bumetanide, 1 mg, Oral, BID  carvedilol, 6.25 mg, Oral, BID With Meals  dexAMETHasone, 4 mg, Intravenous, Q6H  ipratropium-albuterol, 3 mL,  Nebulization, 4x Daily - RT  levETIRAcetam, 500 mg, Intravenous, Q12H  ondansetron, 4 mg, Intravenous, Once  senna-docusate sodium, 2 tablet, Oral, BID  sodium chloride, 10 mL, Intravenous, Q12H      Continuous Infusions: niCARdipine, 5-15 mg/hr, Last Rate: Stopped (09/29/23 0757)        Plan discussed with RN. Reviewed all other data in the last 24 hours, including but not limited to vitals, labs, microbiology, imaging and pertinent notes from other providers.     Gurpreet Vyas, DO   Critical Care  09/29/23   11:52 EDT

## 2023-09-29 NOTE — ED NOTES
Transport on continuous cardiac, Spo2, bp monitor to ICU with family @ bedside; care handoff to RN juan @ bedside

## 2023-09-29 NOTE — SIGNIFICANT NOTE
Indira Mcgarry is a 88 y.o. female with PMH of COPD, CHF, with pacemaker who was recently hospitalized after a fall on 9/11/2023 in which she was diagnosed with a subdural hematoma measuring 12 mm, was sent to the hospital from her rehab facility today for syncopal episodes.  Patient denies chest pain, shortness of air, palpitations, headache, chills, or fever.  Denies abdominal pain, nausea, vomiting, diarrhea, constipation, loss of weight or loss of appetite, dysuria, blood in urine or stool.  Of note patient has a midline catheter in right upper arm, in which family states was placed at her rehab facility due to being dehydrated and needing fluids.     Patient was downgraded earlier today, will follow the patient starting tomorrow.

## 2023-09-29 NOTE — DISCHARGE PLACEMENT REQUEST
"Poornima Mcgarry (88 y.o. Female)       Date of Birth   1934    Social Security Number       Address   [facility] [address] [facility] [place] [address]    Home Phone   [phone]    MRN   [id number]       [employer]   Unknown    Marital Status                               Admission Date   9/28/23    Admission Type   Emergency    Admitting Provider   Gurpreet Vyas DO    Attending Provider   Gurpreet Vyas DO    Department, Room/Bed   [facility] INTENSIVE CARE UNIT, 2301/1       Discharge Date       Discharge Disposition       Discharge Destination                                 Attending Provider: Gurpreet Vyas DO    Allergies: Macrobid [Nitrofurantoin], Penicillins    Isolation: None   Infection: None   Code Status: CPR    Ht: 160 cm (63\")   Wt: 83.3 kg (183 lb 10.3 oz)    Admission Cmt: None   Principal Problem: SDH (subdural hematoma) [S06.5XAA]                   Active Insurance as of 9/28/2023       Primary Coverage       Payor Plan Insurance Group Employer/Plan Group    MEDICARE MEDICARE A & B        Payor Plan Address Payor Plan Phone Number Payor Plan Fax Number Effective Dates    [address] [phone]  12/1/1999 - None Entered    [facility] [id number]         Subscriber Name Subscriber Birth Date Member ID       POORNIMA MCGARRY 1934 [id number]               Secondary Coverage       Payor Plan Insurance Group Employer/Plan Group     FOR LIFE  FOR LIFE MC [id number]         Payor Plan Address Payor Plan Phone Number Payor Plan Fax Number Effective Dates    [address] [phone]  1/1/2023 - None Entered    [facility] [id number]         Subscriber Name Subscriber Birth Date Member ID       POORNIMA MCGARRY 1934 [id number]                     Emergency Contacts        (Rel.) Home Phone Work Phone Mobile Phone    REMA VALDES (Daughter) -- -- [phone]    rema valdes -- -- [phone]                 History & Physical    "     Zayda Verma APRN at 23       Attestation signed by Gurpreet Vyas DO at [phone]    I have reviewed this documentation and agree.                    Critical Care History and Physical     Indira Mcgarry : 1934 MRN:[id number] LOS:0 ROOM:      Reason for admission: SDH (subdural hematoma)     Assessment / Plan     Subdural hematoma  -Following fall 2 weeks ago  -SDH has grown from 12 mm to 20 mm as evidenced by CT this evening  -Right to left midline shift measuring 6 mm  -BP control, SBP <160  -Given IV labetalol in ED  -Will start IV Cardene if needed  -Neurosurgery contacted by ED, will see in a.m.  -Repeat CT in a.m.  -Stat CT for any neurological changes  -IV Keppra  -IV Decadron  -Seizure precautions    Chronic obstructive pulmonary disease  -Per daughter wears 3 L O2 at bedtime  -oxygen supplementation as needed, titrate and wean to maintain oxygen saturations > 91%  -DuoNebs  -Pulmicort    Acute on chronic Diastolic Congestive Heart Failure, with preserved ejection fraction  -Last ECHO completed at [facility] on 2023 with EF 42%  -Patient on carvedilol, currently held 2/2 bradycardia  -Resume home Bumex after verified by pharmacy and clinically appropriate    Essential hypertension  -Resume home hydralazine after verified by pharmacy    Coronary artery disease  -Hold home ASA      Nutrition:   No diet orders on file     DVT prophylaxis:  No DVT prophylaxis order currently exists.     History of Present illness     Indira Mcgarry is a 88 y.o. female with PMH of COPD, CHF, with pacemaker who was recently hospitalized after a fall on 2023 in which she was diagnosed with a subdural hematoma measuring 12 mm, was sent to the hospital from her rehab facility today for syncopal episodes.  Patient denies chest pain, shortness of air, palpitations, headache, chills, or fever.  Denies abdominal pain, nausea, vomiting, diarrhea, constipation, loss of  weight or loss of appetite, dysuria, blood in urine or stool.  Of note patient has a midline catheter in right upper arm, in which family states was placed at her rehab facility due to being dehydrated and needing fluids.    ED course: CT head showed increase in her right cerebral convexity subdural hematoma, now measuring approximately 20 mm. There was also associated mass effect with right to left midline shift measuring approximately 6 mm, which had also increased since 9/11.  Other significant lab work showed BNP 9405, calcium 8.0, albumin 3.1, WBC 11.50, PLT 33.1.    ACP: Patient does not have advanced active on file at this facility.  She is currently alert and oriented x4.  Her daughter, Antonieta is at bedside and states that she is a full code.  There are 4 children,  who share in decision making as there is no POA paperwork naming healthcare surrogate.    Patient was seen and examined on 09/28/23 at 19:41 EDT .    Subjective / Review of systems     Review of Systems   Constitutional:  Negative for chills and fever.   HENT:  Negative for congestion and sore throat.    Respiratory:  Negative for cough and shortness of breath.    Cardiovascular:  Negative for chest pain and palpitations.   Gastrointestinal:  Negative for abdominal pain and nausea.   Endocrine: Negative for heat intolerance and polyuria.   Genitourinary:  Negative for dysuria and urgency.   Musculoskeletal:  Negative for arthralgias and myalgias.   Skin:  Negative for rash and wound.   Neurological:  Positive for dizziness and light-headedness. Negative for weakness and numbness.   Psychiatric/Behavioral:  Negative for suicidal ideas. The patient is not nervous/anxious.       Past Medical/Surgical/Social/Family History & Allergies     Past Medical History:   Diagnosis Date    CHF (congestive heart failure)     COPD (chronic obstructive pulmonary disease)     Pacemaker     Ulcerative colitis       History reviewed. No pertinent surgical history.    Social History     Socioeconomic History    Marital status:    Tobacco Use    Smoking status: Never   Substance and Sexual Activity    Alcohol use: Not Currently    Drug use: Not Currently      History reviewed. No pertinent family history.   Allergies   Allergen Reactions    Macrobid [Nitrofurantoin] Hives    Penicillins Hives      Social Determinants of Health     Tobacco Use: Unknown    Smoking Tobacco Use: Never    Smokeless Tobacco Use: Unknown    Passive Exposure: Not on file   Alcohol Use: Not on file   Financial Resource Strain: Not on file   Food Insecurity: Not on file   Transportation Needs: Not on file   Physical Activity: Not on file   Stress: Not on file   Social Connections: Not on file   Intimate Partner Violence: Not on file   Depression: Not on file   Housing Stability: Not on file        Home Medications     Prior to Admission medications    Medication Sig Start Date End Date Taking? Authorizing Provider   alendronate (FOSAMAX) 70 MG tablet Take 1 tablet by mouth Every 7 (Seven) Days.    Lm Aparicio MD   brimonidine (ALPHAGAN P) 0.1 % solution ophthalmic solution Every 8 (Eight) Hours.    Lm Aparicio MD   carvedilol (COREG) 3.125 MG tablet Take 1 tablet by mouth 2 (Two) Times a Day With Meals.    Lm Aparicio MD   hydrALAZINE (APRESOLINE) 50 MG tablet Take 1 tablet by mouth 3 (Three) Times a Day.    Lm Aparicio MD   Lidocaine 4 % Place 1 patch on the skin as directed by provider Daily. Remove & Discard patch within 12 hours or as directed by Lm Craig MD        Objective / Physical Exam     Vital signs:  Temp: 98.7 °F (37.1 °C)  BP: 158/58  Heart Rate: 65  Resp: 19  SpO2: 91 %  Weight: 90.7 kg (200 lb)    Admission Weight: Weight: 90.7 kg (200 lb)    Physical Exam  Constitutional:       Appearance: Normal appearance. She is normal weight.   HENT:      Head: Normocephalic.      Nose: Nose normal.      Mouth/Throat:      Mouth: Mucous  membranes are moist.   Eyes:      Extraocular Movements: Extraocular movements intact.      Pupils: Pupils are equal, round, and reactive to light.   Cardiovascular:      Rate and Rhythm: Regular rhythm. Bradycardia present.      Pulses: Normal pulses.      Heart sounds: Normal heart sounds.   Pulmonary:      Effort: Pulmonary effort is normal.      Breath sounds: Decreased air movement present.   Abdominal:      General: Bowel sounds are normal.      Palpations: Abdomen is soft.   Musculoskeletal:         General: Normal range of motion.   Skin:     General: Skin is warm and dry.      Coloration: Skin is pale.   Neurological:      General: No focal deficit present.      Mental Status: She is alert.      Comments: No notable neurological deficits at time of assessment.  Patient able to follow commands.  Alert and oriented x4.  Participates in discussion.  Moves all extremities.  No weakness, no numbness, no tingling.  No visual deficits.   Psychiatric:         Mood and Affect: Mood normal.         Behavior: Behavior normal.        Labs     Results from last 7 days   Lab Units 09/28/23  1722 09/27/23  0450 09/25/23  0430 09/23/23  0500   WBC 10*3/mm3 11.50* 7.60 7.10 7.60   HEMATOCRIT % 33.1* 28.9* 30.1* 30.9*   PLATELETS 10*3/mm3 225 195 225 274      Results from last 7 days   Lab Units 09/28/23  1722 09/28/23  0440 09/27/23  0450 09/25/23  0430 09/24/23  0530 09/23/23  0500   SODIUM mmol/L 137 137 136 134*  --  133*   POTASSIUM mmol/L 4.6 4.9 4.6 5.0 5.1 5.4*   CHLORIDE mmol/L 102 105 104 101  --  100   CO2 mmol/L 25.0 26.0 24.0 26.0  --  26.0   BUN mg/dL 20 21 26* 34*  --  40*   CREATININE mg/dL 0.92 0.93 1.28* 1.49*  --  1.46*        Imaging     EKG: My independent evaluation showed normal sinus rhythm, LBBB, no ST -T changes    Current Medications     Scheduled Meds:  labetalol, 10 mg, Intravenous, Once  ondansetron, 4 mg, Intravenous, Once         Continuous Infusions:        Patient continues to be critically  ill, remains at risk of clinical deterioration or death and needed high complexity decision making. I have spent a total of 40 minutes providing critical care services to this patient including but not limited to: review of labs/ microbiology/imaging/medications, serial monitoring of vital signs,  review of other consultant's notes, review of events in the last 24 hrs, monitoring input/output, review of treatment plan with bedside nurse, RT and other treatment team, management of life support and nutrition needs. I also spoke with patient daughterAntonieta about the plan of care and answered all questions.    Time spent in performing separately billable procedures and updating family is not included in the critical care time.     KAYLEN Singh   Critical Care  09/28/23   19:41 EDT       Electronically signed by Gurpreet Vyas DO at 09/29/23 0899

## 2023-09-29 NOTE — PLAN OF CARE
Goal Outcome Evaluation:    Pt remains on 2L NC. Cardene gtt off this AM - PRN and home medications added.   Pt made a KAYODE downgrade, awaiting bed placement.     Pt remains A/O without neurological changes or headache.     Diet orders received - adequate PO intake throughout the day.   Adequate UO. No BM.     Family at bedside and updated appropriately. All questions answered to satisfaction.

## 2023-09-29 NOTE — CONSULTS
"Nutrition Services    Patient Name: Indira Mcgarry  YOB: 1934  MRN: [id number]  Admission date: 9/28/2023      PPE Documentation        PPE Worn By Provider gloves   PPE Worn By Patient  None      NUTRITION SCREENING      Trending Narrative: 9/29: Check on for MST of 2.  RD visited patient at bedside.  Patient reports poor appetite, no N/V, chewing issues, stable weight, wants a Pepsi.  Patient does not like Boost/Ensure.           PO Diet: Diet: Cardiac Diets; Healthy Heart (2-3 Na+); Texture: Regular Texture (IDDSI 7); Fluid Consistency: Thin (IDDSI 0)   PO Supplements: None ordered    Trending PO Intake:  No PO intakes documented since admission        Nutritionally-Pertinent Medications RDN Reviewed, C/W clinical course         Labs (reviewed below): Reviewed, management per attending          Trending Physical   Appearance, NFPE 9/29: NFPE completed and not consistent with nutrition diagnosis of malnutrition at this time using AND/ASPEN criteria        Results from last 7 days   Lab Units 09/29/23  0506 09/28/23  1722 09/28/23  0440   SODIUM mmol/L 137 137 137   POTASSIUM mmol/L 4.8 4.6 4.9   CHLORIDE mmol/L 103 102 105   CO2 mmol/L 29.0 25.0 26.0   BUN mg/dL 18 20 21   CREATININE mg/dL 0.87 0.92 0.93   CALCIUM mg/dL 7.9* 8.0* 8.0*   BILIRUBIN mg/dL 0.5 0.5  --    ALK PHOS U/L 126* 132*  --    ALT (SGPT) U/L 5 12  --    AST (SGOT) U/L 11 15  --    GLUCOSE mg/dL 120* 105* 92     Results from last 7 days   Lab Units 09/29/23  0506   MAGNESIUM mg/dL 1.8   PHOSPHORUS mg/dL 4.0   HEMOGLOBIN g/dL 9.9*   HEMATOCRIT % 30.6*     No results found for: HGBA1C       GI Function:  No BM since admission (x 1 day ago)       Skin: Intact        Weight Review: Estimated body mass index is 32.53 kg/m² as calculated from the following:    Height as of this encounter: 160 cm (63\").    Weight as of this encounter: 83.3 kg (183 lb 10.3 oz).    Comment:   No recent weight to note (200# estimated)    Wt Readings from " Last 30 Encounters:   09/28/23 2100 83.3 kg (183 lb 10.3 oz)   09/28/23 1617 90.7 kg (200 lb)   04/22/19 1152 95.3 kg (210 lb)   02/28/19 1107 95.9 kg (211 lb 6.4 oz)   08/20/18 1208 96.2 kg (212 lb)   02/15/18 1108 97.3 kg (214 lb 6.4 oz)   08/10/17 1130 98.5 kg (217 lb 3.2 oz)   02/09/17 0924 98.7 kg (217 lb 8 oz)   09/01/16 0923 97.3 kg (214 lb 8 oz)          --       Nutrition Problem Statement: No nutritional diagnosis noted at this time, RD will continue to monitor for any nutritional diagnosis that may arise.        Nutrition Intervention: Continue current diet and encourage good PO intakes.          Monitoring/Evaluation Per protocol, I&O, PO intake, Supplement intake, Pertinent labs, Weight, Skin status, GI status, Symptoms, POC/GOC          RD to follow up per protocol.    Electronically signed by:  Sabra Abarca RD  09/29/23 14:27 EDT

## 2023-09-29 NOTE — CASE MANAGEMENT/SOCIAL WORK
Discharge Planning Assessment   Rasheed     Patient Name: Indira Mcgarry  MRN: [id number]  Today's Date: 9/29/2023    Admit Date: 9/28/2023    Plan: DC Plan: Returnt to [facility] accepted and following. No precert or PASRR required.   Discharge Needs Assessment       Row Name 09/29/23 1609       Living Environment    People in Home other (see comments)  [facility]    Current Living Arrangements other (see comments)  Acute IP Rehab [facility]    Potentially Unsafe Housing Conditions none    Primary Care Provided by other (see comments)  facility staff    Provides Primary Care For no one, unable/limited ability to care for self    Family Caregiver if Needed child(tanner), adult    Family Caregiver Names Crow Pennington    Quality of Family Relationships supportive;involved    Able to Return to Prior Arrangements yes    Living Arrangement Comments Patient agreeable to return to [facility]       Resource/Environmental Concerns    Resource/Environmental Concerns none    Transportation Concerns none       Food Insecurity    Within the past 12 months, you worried that your food would run out before you got the money to buy more. Never true    Within the past 12 months, the food you bought just didn't last and you didn't have money to get more. Never true       Transition Planning    Patient/Family Anticipates Transition to inpatient rehabilitation facility    Patient/Family Anticipated Services at Transition rehabilitation services    Transportation Anticipated family or friend will provide       Discharge Needs Assessment    Readmission Within the Last 30 Days no previous admission in last 30 days    Current Outpatient/Agency/Support Group inpatient rehabilitation facility    Equipment Currently Used at Home oxygen;walker, oksana  Oxygen provided by [employer] at home    Concerns to be Addressed discharge planning    Anticipated Changes Related to Illness none    Equipment Needed After Discharge none    Outpatient/Agency/Support Group Needs  inpatient rehabilitation facility    Discharge Facility/Level of Care Needs rehabilitation facility    Provided Post Acute Provider List? N/A    Provided Post Acute Provider Quality & Resource List? N/A    Patient's Choice of Community Agency(s) Patient agreeable to return to [facility]    Current Discharge Risk physical impairment                   Discharge Plan       Row Name 09/29/23 1611       Plan    Plan DC Plan: Returnt to [facility] accepted and following. No precert or PASRR required.    Patient/Family in Agreement with Plan yes    Provided Post Acute Provider List? N/A    Provided Post Acute Provider Quality & Resource List? N/A    Plan Comments CM spoke with patient at bedside to discuss admission assessment and discharge planning. Patient confirms PCP and pharmacy. Meds to bed not appropriate, facility patient. Patient denies any difficulty affording medications at this time. Patient denies any additional needs for services or DME at this time. Patient is agreeable to return to [facility] when discharged. CM placed referral in basket and liaison notified. Will continue to follow for care.CM spoke with patient's nurse, intensivist, and NP to obtain clinical updates. Plan to downgrade to KAYODE level of care today. CM will continue to follow for any further needs and adjust discharge plan accordingly. DC Barriers:O2@2L nc, and neurological monitoring.                  Continued Care and Services - Admitted Since 9/28/2023       Destination Coordination complete.      Service Provider Request Status Selected Services Address Phone Fax Patient Preferred    [facility]  Selected Inpatient Rehabilitation [address] [facility] [place] [address] [phone] --                  Expected Discharge Date and Time       Expected Discharge Date Expected Discharge Time    Sep 30, 2023            Demographic Summary       Row Name 09/29/23 1601       General Information    Admission Type inpatient     Arrived From emergency department;other (see comments)  Acute IP Rehab [facility]    Required Notices Provided Important Message from Medicare    Referral Source admission list    Reason for Consult discharge planning    Preferred Language English       Contact Information    Permission Granted to Share Info With                    Functional Status       Row Name 09/29/23 1608       Functional Status    Usual Activity Tolerance fair    Current Activity Tolerance fair       Physical Activity    On average, how many days per week do you engage in moderate to strenuous exercise (like a brisk walk)? 7 days    On average, how many minutes do you engage in exercise at this level? 10 min    Number of minutes of exercise per week 70       Functional Status, IADL    Medications assistive person    Meal Preparation assistive person    Housekeeping assistive person    Laundry assistive person    Shopping assistive person       Mental Status    General Appearance WDL WDL       Mental Status Summary    Recent Changes in Mental Status/Cognitive Functioning no changes       Employment/    Employment Status retired;, previous service    Current or Previous Occupation not applicable           Current or Previous  Service none               Clover Beckham RN    Office Phone: [phone]  Office Cell:     [phone]

## 2023-09-29 NOTE — ED NOTES
Nursing report ED to floor  Indira Mcgarry  88 y.o.  female    HPI :   Chief Complaint   Patient presents with    Syncope       Admitting doctor:   Gurpreet Vyas DO    Admitting diagnosis:   The encounter diagnosis was Subdural hematoma.    Code status:   Current Code Status       Date Active Code Status Order ID Comments User Context       9/28/2023 1942 CPR (Attempt to Resuscitate) [id number]  Zayda Verma APRN ED        Question Answer    Code Status (Patient has no pulse and is not breathing) CPR (Attempt to Resuscitate)    Medical Interventions (Patient has pulse or is breathing) Full Support                    Allergies:   Macrobid [nitrofurantoin] and Penicillins    Isolation:   No active isolations    Intake and Output  No intake or output data in the 24 hours ending 09/28/23 2017    Weight:       09/28/23  1617   Weight: 90.7 kg (200 lb)       Most recent vitals:   Vitals:    09/28/23 1618 09/28/23 1701 09/28/23 1831 09/28/23 1930   BP: 170/57 162/58 164/63 158/58   BP Location: Left arm Left arm     Patient Position: Lying Lying Lying Lying   Pulse: 67 65 66 65   Resp: 18 18 18 19   Temp: 98.7 °F (37.1 °C)      TempSrc: Oral      SpO2: 96% 93% 92% 91%   Weight:       Height:           Active LDAs/IV Access:   Lines, Drains & Airways       Active LDAs       Name Placement date Placement time Site Days    Peripheral IV 09/28/23 1722 Left Antecubital 09/28/23  1722  Antecubital  less than 1                    Labs (abnormal labs have a star):   Labs Reviewed   COMPREHENSIVE METABOLIC PANEL - Abnormal; Notable for the following components:       Result Value    Glucose 105 (*)     Calcium 8.0 (*)     Total Protein 5.7 (*)     Albumin 3.1 (*)     Alkaline Phosphatase 132 (*)     eGFR 60.0 (*)     All other components within normal limits    Narrative:     GFR Normal >60  Chronic Kidney Disease <60  Kidney Failure <15    The GFR formula is only valid for adults with stable renal function between  ages 18 and 70.   BNP (IN-HOUSE) - Abnormal; Notable for the following components:    proBNP 9,405.0 (*)     All other components within normal limits    Narrative:     This assay is used as an aid in the diagnosis of individuals suspected of having heart failure. It can be used as an aid in the diagnosis of acute decompensated heart failure (ADHF) in patients presenting with signs and symptoms of ADHF to the emergency department (ED). In addition, NT-proBNP of <300 pg/mL indicates ADHF is not likely.   SINGLE HSTROPONIN T - Abnormal; Notable for the following components:    HS Troponin T 50 (*)     All other components within normal limits    Narrative:     High Sensitive Troponin T Reference Range:  <10.0 ng/L- Negative Female for AMI  <15.0 ng/L- Negative Male for AMI  >=10 - Abnormal Female indicating possible myocardial injury.  >=15 - Abnormal Male indicating possible myocardial injury.   Clinicians would have to utilize clinical acumen, EKG, Troponin, and serial changes to determine if it is an Acute Myocardial Infarction or myocardial injury due to an underlying chronic condition.        CBC WITH AUTO DIFFERENTIAL - Abnormal; Notable for the following components:    WBC 11.50 (*)     RBC 3.74 (*)     Hemoglobin 10.6 (*)     Hematocrit 33.1 (*)     RDW 15.8 (*)     Lymphocyte % 16.3 (*)     Neutrophils, Absolute 7.90 (*)     Monocytes, Absolute 1.30 (*)     All other components within normal limits   PROTIME-INR - Abnormal; Notable for the following components:    Protime 12.1 (*)     INR 1.12 (*)     All other components within normal limits   APTT - Abnormal; Notable for the following components:    PTT 27.8 (*)     All other components within normal limits   TSH - Normal   T4, FREE - Normal    Narrative:     Results may be falsely increased if patient taking Biotin.     MRSA SCREEN, PCR   CBC AND DIFFERENTIAL    Narrative:     The following orders were created for panel order CBC & Differential.  Procedure                                Abnormality         Status                     ---------                               -----------         ------                     CBC Auto Differential[[id number]]        Abnormal            Final result                 Please view results for these tests on the individual orders.   EXTRA TUBES    Narrative:     The following orders were created for panel order Extra Tubes.  Procedure                               Abnormality         Status                     ---------                               -----------         ------                     Gold Top - SST[[id number]]                                   Final result               Light Blue Top[[id number]]                                   Final result                 Please view results for these tests on the individual orders.   GOLD TOP - SST   LIGHT BLUE TOP       EKG:   ECG 12 Lead Syncope   Preliminary Result   HEART RATE= 66  bpm   RR Interval= 912  ms   [place] Interval= 158  ms   P Horizontal Axis= 32  deg   P Front Axis= 31  deg   QRSD Interval= 143  ms   QT Interval= 461  ms   QTcB= 483  ms   QRS Axis= 37  deg   T Wave Axis= 115  deg   - ABNORMAL ECG -   Sinus rhythm   Left bundle branch block   ST elevation secondary to IVCD   Electronically Signed By:    Date and Time of Study: 2023-09-28 16:32:47          Meds given in ED:   Medications   sodium chloride 0.9 % flush 10 mL (has no administration in time range)   ondansetron (ZOFRAN) injection 4 mg (4 mg Intravenous Not Given 9/28/23 2010)   labetalol (NORMODYNE,TRANDATE) injection 10 mg (has no administration in time range)   nitroglycerin (NITROSTAT) SL tablet 0.4 mg (has no administration in time range)   sodium chloride 0.9 % flush 10 mL (has no administration in time range)   sodium chloride 0.9 % flush 10 mL (has no administration in time range)   sodium chloride 0.9 % infusion 40 mL (has no administration in time range)   sennosides-docusate (PERICOLACE) 8.6-50 MG per  tablet 2 tablet (has no administration in time range)     And   polyethylene glycol (MIRALAX) packet 17 g (has no administration in time range)     And   bisacodyl (DULCOLAX) EC tablet 5 mg (has no administration in time range)     And   bisacodyl (DULCOLAX) suppository 10 mg (has no administration in time range)   ondansetron (ZOFRAN) tablet 4 mg (has no administration in time range)     Or   ondansetron (ZOFRAN) injection 4 mg (has no administration in time range)   niCARdipine (CARDENE) 25mg in 250mL NS infusion (has no administration in time range)   levETIRAcetam (KEPPRA) injection 500 mg (has no administration in time range)       Imaging results:  CT Head Without Contrast    Result Date: 9/28/2023  Right cerebral convexity subdural hematoma is predominantly hypodense with scattered isodense components. This now measures approximately 20 mm in thickness (previously measured up to 12 mm on 9/11/2023). There is associated mass effect with right to left midline shift measuring approximately 6 mm as measured on series 3, image 27. Midline shift appears slightly increased since 9/11/2023. No findings to suggest uncal/transtentorial herniation. Additional findings compatible with chronic microvascular ischemic change and diffuse cortical atrophy. Electronically Signed: Marco Antonio Vinson MD  9/28/2023 6:15 PM EDT  Workstation ID: [id number]     Ambulatory status:   - bedrest    Social issues:   Social History     Socioeconomic History    Marital status:    Tobacco Use    Smoking status: Never   Substance and Sexual Activity    Alcohol use: Not Currently    Drug use: Not Currently       NIH Stroke Scale:       Christy Lr RN  09/28/23 20:17 EDT

## 2023-09-29 NOTE — CONSULTS
[facility] NEUROSURGERY CONSULT NOTE    Patient Name: Indira Mcgarry  Referring Provider:Rubin Sethi APRN    Reason for Consultation: Subdural hematoma    Patient Care Team:  Brandon Lynch MD as PCP - General    Chief Complaint: Syncopal episode    History of Present Illness:  Patient is a 88 y.o.  female that was previously seen and evaluated at outside facility approximately 2 1/2 weeks ago for syncopal episode striking her head resulting in a traumatic right-sided subdural hematoma with mass effect.  Nonsurgical management was recommended and patient was to follow-up in outpatient setting with CT head to evaluate for possible need of cerebral angiogram with MMA embolization for pseudomembranes.  Patient was discharged to rehab.  She presented to [facility] emergency department on 9/20/2023 from her rehab facility for syncopal episodes.  ED work-up included a CT head which demonstrated that there was an increase in her right cerebral convexity subdural hematoma.  There was also increase in her mass effect with right to left midline shift.  She was alert and oriented x4 upon arrival.  Upon my evaluation, patient is alert and oriented x4.  Patient does report doing well in rehab she stood up yesterday and had another syncopal episode.  She reports that she did not strike her head.  Of note patient was found to be dehydrated at the rehab facility and given fluids.  Patient's daughter reports that on her last hospitalization, her syncopal episodes were possibly attributed to her blood pressure versus her cardiac pacemaker.  She was to follow-up with her cardiologist.  Patient denies any headache, visual changes, speech issues, unilateral sensorimotor deficits.  She does have some chronic dizziness present since a child that has not worsened.      Review of Systems:  14 point review of systems is negative except for those listed in the HPI  Review of Systems   Neurological:  Positive for dizziness.    All other systems reviewed and are negative.    Past Medical History:  Past Medical History:   Diagnosis Date    CHF (congestive heart failure)     COPD (chronic obstructive pulmonary disease)     Hypertension     Pacemaker     Ulcerative colitis        Past Surgical History:  Past Surgical History:   Procedure Laterality Date    CARDIAC CATHETERIZATION      EYE SURGERY      JOINT REPLACEMENT       Reviewed past surgical history and it is not pertinent to this visit    Family History:  History reviewed. No pertinent family history.  Reviewed past family history and it is not pertinent to this visit    Social History:  Social History     Tobacco Use    Smoking status: Never    Smokeless tobacco: Never   Vaping Use    Vaping Use: Never used   Substance Use Topics    Alcohol use: Not Currently    Drug use: Not Currently     Reviewed past social history and it is not pertinent to this visit    Allergies:  Macrobid [nitrofurantoin] and Penicillins    Home Medications:  Prior to Admission medications    Medication Sig Start Date End Date Taking? Authorizing Provider   acetaminophen (TYLENOL) 325 MG tablet Take 2 tablets by mouth Every 6 (Six) Hours As Needed for Mild Pain.   Yes Lm Aparicio MD   albuterol (PROVENTIL) (2.5 MG/3ML) 0.083% nebulizer solution Take 2.5 mg by nebulization Every 6 (Six) Hours As Needed for Wheezing.   Yes ProviderLm MD   alendronate (FOSAMAX) 70 MG tablet Take 1 tablet by mouth Every 7 (Seven) Days. Mondays   Yes Lm Aparicio MD   aluminum-magnesium hydroxide-simethicone (Mag-Al Plus) 200-200-20 MG/5ML suspension Take 30 mL by mouth 3 (Three) Times a Day As Needed for Indigestion or Heartburn.   Yes Lm Aparicio MD   brimonidine (ALPHAGAN P) 0.1 % solution ophthalmic solution Administer 1 drop to both eyes Every 8 (Eight) Hours.   Yes Lm Aparicio MD   bumetanide (BUMEX) 1 MG tablet Take 1 tablet by mouth 2 (Two) Times a Day.   Yes Alessandra  MD Lm   calcium carbonate (TUMS) 500 MG chewable tablet Chew 1 tablet 3 (Three) Times a Day As Needed for Indigestion or Heartburn.   Yes Lm Aparicio MD   carvedilol (COREG) 3.125 MG tablet Take 1 tablet by mouth 2 (Two) Times a Day With Meals.   Yes Lm Aparicio MD   Diclofenac Sodium (VOLTAREN) 1 % gel gel Apply 4 g topically to the appropriate area as directed 3 (Three) Times a Day As Needed.   Yes Lm Aparicio MD   diphenhydrAMINE (BENADRYL) 25 mg capsule Take 12.5 mg by mouth 3 (Three) Times a Day As Needed for Itching.   Yes Lm Aparicio MD   diphenhydrAMINE-zinc acetate 2-0.1 % cream Apply 1 application  topically to the appropriate area as directed 2 (Two) Times a Day As Needed for Itching (Fist choice for itch).   Yes Lm Aparicio MD   docusate sodium (COLACE) 100 MG capsule Take 1 capsule by mouth 2 (Two) Times a Day.   Yes Lm Aparicio MD   fluorometholone (FML) 0.1 % ophthalmic suspension Administer 1 drop into the left eye Daily.   Yes Lm Aparicio MD   hydrALAZINE (APRESOLINE) 25 MG tablet Take 2 tablets by mouth 3 (Three) Times a Day.   Yes Lm Aparicio MD   lidocaine (LIDODERM) 5 % Place 1 patch on the skin as directed by provider Daily. Remove & Discard patch within 12 hours or as directed by MD   Yes Provider, Historical, MD   loperamide (IMODIUM) 2 MG capsule Take 1 capsule by mouth 4 (Four) Times a Day As Needed for Diarrhea (max 16mg/day).   Yes Lm Aparicio MD   magnesium hydroxide (MILK OF MAGNESIA) 2400 MG/10ML suspension suspension Take 30 mL by mouth Daily As Needed.   Yes Lm Aparicio MD   meclizine (ANTIVERT) 25 MG tablet Take 0.5 tablets by mouth Daily As Needed for Dizziness.   Yes Provider, Historical, MD   melatonin 5 MG tablet tablet Take 1 tablet by mouth At Night As Needed.   Yes Lm Aparicio MD   ondansetron (ZOFRAN) 2 mg/mL injection Infuse 2 mL into a venous catheter  Every 4 (Four) Hours As Needed for Nausea or Vomiting (if pt cannot tolerate po).   Yes Lm Aparicio MD   ondansetron (ZOFRAN) 4 MG tablet Take 1 tablet by mouth Every 4 (Four) Hours As Needed for Nausea or Vomiting.   Yes Lm Aparicio MD   POLYVINYL ALCOHOL-POVIDONE OP Administer 1 drop to the right eye 2 (Two) Times a Day.   Yes Lm Aparicio MD   simethicone (MYLICON) 80 MG chewable tablet Chew 1 tablet 4 (Four) Times a Day As Needed for Flatulence.   Yes Lm Aparicio MD       Results Review:  Labs:  Recent Results (from the past 24 hour(s))   ECG 12 Lead Syncope    Collection Time: 09/28/23  4:32 PM   Result Value Ref Range    QT Interval 461 ms    QTC Interval 483 ms   Comprehensive Metabolic Panel    Collection Time: 09/28/23  5:22 PM    Specimen: Blood   Result Value Ref Range    Glucose 105 (H) 65 - 99 mg/dL    BUN 20 8 - 23 mg/dL    Creatinine 0.92 0.57 - 1.00 mg/dL    Sodium 137 136 - 145 mmol/L    Potassium 4.6 3.5 - 5.2 mmol/L    Chloride 102 98 - 107 mmol/L    CO2 25.0 22.0 - 29.0 mmol/L    Calcium 8.0 (L) 8.6 - 10.5 mg/dL    Total Protein 5.7 (L) 6.0 - 8.5 g/dL    Albumin 3.1 (L) 3.5 - 5.2 g/dL    ALT (SGPT) 12 1 - 33 U/L    AST (SGOT) 15 1 - 32 U/L    Alkaline Phosphatase 132 (H) 39 - 117 U/L    Total Bilirubin 0.5 0.0 - 1.2 mg/dL    Globulin 2.6 gm/dL    A/G Ratio 1.2 g/dL    BUN/Creatinine Ratio 21.7 7.0 - 25.0    Anion Gap 10.0 5.0 - 15.0 mmol/L    eGFR 60.0 (L) >60.0 mL/min/1.73   BNP    Collection Time: 09/28/23  5:22 PM    Specimen: Blood   Result Value Ref Range    proBNP 9,405.0 (H) 0.0 - 1,800.0 pg/mL   Single High Sensitivity Troponin T    Collection Time: 09/28/23  5:22 PM    Specimen: Blood   Result Value Ref Range    HS Troponin T 50 (H) <10 ng/L   TSH    Collection Time: 09/28/23  5:22 PM    Specimen: Blood   Result Value Ref Range    TSH 2.550 0.270 - 4.200 uIU/mL   T4, Free    Collection Time: 09/28/23  5:22 PM    Specimen: Blood   Result Value Ref  Range    Free T4 1.41 0.93 - 1.70 ng/dL   CBC Auto Differential    Collection Time: 09/28/23  5:22 PM    Specimen: Blood   Result Value Ref Range    WBC 11.50 (H) 3.40 - 10.80 10*3/mm3    RBC 3.74 (L) 3.77 - 5.28 10*6/mm3    Hemoglobin 10.6 (L) 12.0 - 15.9 g/dL    Hematocrit 33.1 (L) 34.0 - 46.6 %    MCV 88.4 79.0 - 97.0 fL    MCH 28.2 26.6 - 33.0 pg    MCHC 31.9 31.5 - 35.7 g/dL    RDW 15.8 (H) 12.3 - 15.4 %    RDW-SD 49.0 37.0 - 54.0 fl    MPV 9.7 6.0 - 12.0 fL    Platelets 225 140 - 450 10*3/mm3    Neutrophil % 68.8 42.7 - 76.0 %    Lymphocyte % 16.3 (L) 19.6 - 45.3 %    Monocyte % 11.7 5.0 - 12.0 %    Eosinophil % 2.4 0.3 - 6.2 %    Basophil % 0.8 0.0 - 1.5 %    Neutrophils, Absolute 7.90 (H) 1.70 - 7.00 10*3/mm3    Lymphocytes, Absolute 1.90 0.70 - 3.10 10*3/mm3    Monocytes, Absolute 1.30 (H) 0.10 - 0.90 10*3/mm3    Eosinophils, Absolute 0.30 0.00 - 0.40 10*3/mm3    Basophils, Absolute 0.10 0.00 - 0.20 10*3/mm3    nRBC 0.0 0.0 - 0.2 /100 WBC   Gold Top - SST    Collection Time: 09/28/23  5:22 PM   Result Value Ref Range    Extra Tube hold    Light Blue Top    Collection Time: 09/28/23  5:22 PM   Result Value Ref Range    Extra Tube Hold for add-ons.    Protime-INR    Collection Time: 09/28/23  5:22 PM    Specimen: Blood   Result Value Ref Range    Protime 12.1 (H) 9.6 - 11.7 Seconds    INR 1.12 (H) 0.93 - 1.10   aPTT    Collection Time: 09/28/23  5:22 PM    Specimen: Blood   Result Value Ref Range    PTT 27.8 (L) 61.0 - 76.5 seconds   MRSA Screen, PCR (Inpatient) - Swab, Nares    Collection Time: 09/28/23 10:24 PM    Specimen: Nares; Swab   Result Value Ref Range    MRSA PCR No MRSA Detected No MRSA Detected   Magnesium    Collection Time: 09/29/23  5:06 AM    Specimen: Blood   Result Value Ref Range    Magnesium 1.8 1.6 - 2.4 mg/dL   Phosphorus    Collection Time: 09/29/23  5:06 AM    Specimen: Blood   Result Value Ref Range    Phosphorus 4.0 2.5 - 4.5 mg/dL   Comprehensive Metabolic Panel    Collection Time:  09/29/23  5:06 AM    Specimen: Blood   Result Value Ref Range    Glucose 120 (H) 65 - 99 mg/dL    BUN 18 8 - 23 mg/dL    Creatinine 0.87 0.57 - 1.00 mg/dL    Sodium 137 136 - 145 mmol/L    Potassium 4.8 3.5 - 5.2 mmol/L    Chloride 103 98 - 107 mmol/L    CO2 29.0 22.0 - 29.0 mmol/L    Calcium 7.9 (L) 8.6 - 10.5 mg/dL    Total Protein 5.1 (L) 6.0 - 8.5 g/dL    Albumin 2.8 (L) 3.5 - 5.2 g/dL    ALT (SGPT) 5 1 - 33 U/L    AST (SGOT) 11 1 - 32 U/L    Alkaline Phosphatase 126 (H) 39 - 117 U/L    Total Bilirubin 0.5 0.0 - 1.2 mg/dL    Globulin 2.3 gm/dL    A/G Ratio 1.2 g/dL    BUN/Creatinine Ratio 20.7 7.0 - 25.0    Anion Gap 5.0 5.0 - 15.0 mmol/L    eGFR 64.2 >60.0 mL/min/1.73   CBC Auto Differential    Collection Time: 09/29/23  5:06 AM    Specimen: Blood   Result Value Ref Range    WBC 5.80 3.40 - 10.80 10*3/mm3    RBC 3.52 (L) 3.77 - 5.28 10*6/mm3    Hemoglobin 9.9 (L) 12.0 - 15.9 g/dL    Hematocrit 30.6 (L) 34.0 - 46.6 %    MCV 86.9 79.0 - 97.0 fL    MCH 28.1 26.6 - 33.0 pg    MCHC 32.4 31.5 - 35.7 g/dL    RDW 15.5 (H) 12.3 - 15.4 %    RDW-SD 47.3 37.0 - 54.0 fl    MPV 9.7 6.0 - 12.0 fL    Platelets 213 140 - 450 10*3/mm3    Neutrophil % 82.2 (H) 42.7 - 76.0 %    Lymphocyte % 14.8 (L) 19.6 - 45.3 %    Monocyte % 1.8 (L) 5.0 - 12.0 %    Eosinophil % 0.1 (L) 0.3 - 6.2 %    Basophil % 1.1 0.0 - 1.5 %    Neutrophils, Absolute 4.70 1.70 - 7.00 10*3/mm3    Lymphocytes, Absolute 0.90 0.70 - 3.10 10*3/mm3    Monocytes, Absolute 0.10 0.10 - 0.90 10*3/mm3    Eosinophils, Absolute 0.00 0.00 - 0.40 10*3/mm3    Basophils, Absolute 0.10 0.00 - 0.20 10*3/mm3    nRBC 0.0 0.0 - 0.2 /100 WBC       Radiology:  CT Head Without Contrast    Result Date: 9/29/2023  Impression: 1.Stable subdural collection on the right. 2.Residual moderate mass effect. There remains a leftward midline shift of approximately 5 mm which is stable. The basilar cisterns are patent. There is no uncal or cerebellar tonsillar herniation. Electronically Signed:  "Eric Sanz MD  9/29/2023 7:58 AM EDT  Workstation ID: [id number]    CT Head Without Contrast    Result Date: 9/28/2023  Right cerebral convexity subdural hematoma is predominantly hypodense with scattered isodense components. This now measures approximately 20 mm in thickness (previously measured up to 12 mm on 9/11/2023). There is associated mass effect with right to left midline shift measuring approximately 6 mm as measured on series 3, image 27. Midline shift appears slightly increased since 9/11/2023. No findings to suggest uncal/transtentorial herniation. Additional findings compatible with chronic microvascular ischemic change and diffuse cortical atrophy. Electronically Signed: Marco Antonio Vinson MD  9/28/2023 6:15 PM EDT  Workstation ID: [id number]    XR Chest 1 View    Result Date: 9/28/2023  Impression: Right upper extremity catheter appears to terminate along the right upper chest consistent with a midline catheter. Cardiomegaly. No evidence of acute cardiopulmonary process. Electronically Signed: Bobo Nunez MD  9/28/2023 9:38 PM EDT  Workstation ID: [id number]     Results Review:   I reviewed the patient's new clinical results.  I personally viewed and interpreted the patient's CT head without contrast.    Vital Signs:   Temp:  [97.5 °F (36.4 °C)-98.7 °F (37.1 °C)] 97.5 °F (36.4 °C)  Heart Rate:  [56-72] 56  Resp:  [15-19] 15  BP: (100-171)/(49-80) 135/57        Physical Exam:  /57 (BP Location: Left arm, Patient Position: Lying)   Pulse 56   Temp 97.5 °F (36.4 °C) (Oral)   Resp 15   Ht 160 cm (63\")   Wt 83.3 kg (183 lb 10.3 oz)   SpO2 99%   BMI 32.53 kg/m²     General Appearance:  Alert, cooperative, no distress, appropriate for age                             Head:  Normocephalic, without obvious abnormality                              Eyes:  PERRL, EOM's intact, conjunctivae and cornea clear,                              Lungs:  respirations unlabored, no audible wheeze               "               Heart:  regular rate & rhythm, S1 and S2 normal                      Abdomen:  Soft, nontender, bowel sounds active all four quadrants          Musculoskeletal:  Tone and strength strong and symmetrical, all extremities; no joint pain or edema                                        Lymphatic:  No adenopathy              Skin/Hair/Nails:  Skin warm, dry and intact, no rashes or abnormal dyspigmentation                      Neurologic:   Mental Status: GCS 15 , the patient is awake, alert and oriented x 3. Recent and remote memory functions are normal.  Language is fluent. Speech is clear but slightly delayed. The speech is nondysarthric. Fund of knowledge is normal.  Cranial Nerve I: Not tested.  Cranial Nerve II: The pupils are 3 mm, equally round and reactive to light. Visual fields are full to confrontation.  Cranial Nerves III, IV, VI: The extraocular movements are full in all directions of gaze without nystagmus.  Cranial Nerve V: Facial sensation is intact to light touch.  Cranial Nerve VII: Facial movements are symmetric  Cranial Nerve XII: Midline tongue protrusion without atrophy or fasciculations.  Motor: Tone is normal in all four extremities without fasciculations, atrophy, or myoclonus. There are no involuntary movements.   Muscle Strength: no pronator drift,  Left upper extremity proximal weakness likely secondary to chronic left shoulder arthritis.   Cerebellar: Finger to nose intact bilaterally.       SDH (subdural hematoma)      This is an 88-year-old female with significant cardiac comorbidities that is presenting with a worsening right convexity subdural hematoma with mass effect and midline shift.  Fluid collection appears to be chronic. Neurologically, patient has no pronator drift or obvious acute cognitive changes.  I did speak with the daughter who does confirm her mother's baseline.  There is no emergent neurosurgical intervention warranted at this time.  Patient likely to  "benefit from MMA procedure and is recommended short-term follow-up with Dr. Haque in [place] or if they choose, welcome to follow back up with Dr. Sousa since she has been previously established with him.  She should continue with seizure prophylaxis.  Patient should undergo further work-up for her syncopal episodes.       PLAN:   Subdural hematoma      - No emergent neurosurgical intervention warranted  -Continue Keppra for seizure prophylaxis  - Okay to transfer to floor per neurosurgical standpoint  - Systolic blood pressure less than 160  - Hold AC/AP  - Follow-up 1-2 weeks with Dr. Haque or previously established outside neurosurgeon for MMA procedure        I discussed the patient's findings and my recommendations with patient, family, and nursing staff and Dr. Kruse.    Emma Gregg, APRN  09/29/23  12:49 EDT    \"Dictated utilizing Dragon dictation\".      "

## 2023-09-30 VITALS
WEIGHT: 173.94 LBS | RESPIRATION RATE: 17 BRPM | BODY MASS INDEX: 30.82 KG/M2 | HEIGHT: 63 IN | DIASTOLIC BLOOD PRESSURE: 48 MMHG | TEMPERATURE: 97.8 F | OXYGEN SATURATION: 96 % | SYSTOLIC BLOOD PRESSURE: 127 MMHG | HEART RATE: 57 BPM

## 2023-09-30 LAB
ALBUMIN SERPL-MCNC: 2.8 G/DL (ref 3.5–5.2)
ALBUMIN/GLOB SERPL: 1.2 G/DL
ALP SERPL-CCNC: 114 U/L (ref 39–117)
ALT SERPL W P-5'-P-CCNC: 7 U/L (ref 1–33)
ANION GAP SERPL CALCULATED.3IONS-SCNC: 7 MMOL/L (ref 5–15)
AST SERPL-CCNC: 9 U/L (ref 1–32)
BASOPHILS # BLD AUTO: 0 10*3/MM3 (ref 0–0.2)
BASOPHILS NFR BLD AUTO: 0.1 % (ref 0–1.5)
BILIRUB SERPL-MCNC: 0.3 MG/DL (ref 0–1.2)
BUN SERPL-MCNC: 26 MG/DL (ref 8–23)
BUN/CREAT SERPL: 26 (ref 7–25)
CALCIUM SPEC-SCNC: 7.7 MG/DL (ref 8.6–10.5)
CHLORIDE SERPL-SCNC: 96 MMOL/L (ref 98–107)
CO2 SERPL-SCNC: 29 MMOL/L (ref 22–29)
CREAT SERPL-MCNC: 1 MG/DL (ref 0.57–1)
DEPRECATED RDW RBC AUTO: 49.4 FL (ref 37–54)
EGFRCR SERPLBLD CKD-EPI 2021: 54.3 ML/MIN/1.73
EOSINOPHIL # BLD AUTO: 0 10*3/MM3 (ref 0–0.4)
EOSINOPHIL NFR BLD AUTO: 0 % (ref 0.3–6.2)
ERYTHROCYTE [DISTWIDTH] IN BLOOD BY AUTOMATED COUNT: 15.7 % (ref 12.3–15.4)
GLOBULIN UR ELPH-MCNC: 2.3 GM/DL
GLUCOSE SERPL-MCNC: 147 MG/DL (ref 65–99)
HCT VFR BLD AUTO: 31.9 % (ref 34–46.6)
HGB BLD-MCNC: 10.3 G/DL (ref 12–15.9)
LYMPHOCYTES # BLD AUTO: 0.9 10*3/MM3 (ref 0.7–3.1)
LYMPHOCYTES NFR BLD AUTO: 9.7 % (ref 19.6–45.3)
MAGNESIUM SERPL-MCNC: 1.7 MG/DL (ref 1.6–2.4)
MCH RBC QN AUTO: 28 PG (ref 26.6–33)
MCHC RBC AUTO-ENTMCNC: 32.5 G/DL (ref 31.5–35.7)
MCV RBC AUTO: 86.2 FL (ref 79–97)
MONOCYTES # BLD AUTO: 0.4 10*3/MM3 (ref 0.1–0.9)
MONOCYTES NFR BLD AUTO: 3.8 % (ref 5–12)
NEUTROPHILS NFR BLD AUTO: 8.2 10*3/MM3 (ref 1.7–7)
NEUTROPHILS NFR BLD AUTO: 86.4 % (ref 42.7–76)
NRBC BLD AUTO-RTO: 0.1 /100 WBC (ref 0–0.2)
PHOSPHATE SERPL-MCNC: 3.7 MG/DL (ref 2.5–4.5)
PLATELET # BLD AUTO: 220 10*3/MM3 (ref 140–450)
PMV BLD AUTO: 10.4 FL (ref 6–12)
POTASSIUM SERPL-SCNC: 4 MMOL/L (ref 3.5–5.2)
PROT SERPL-MCNC: 5.1 G/DL (ref 6–8.5)
RBC # BLD AUTO: 3.7 10*6/MM3 (ref 3.77–5.28)
SODIUM SERPL-SCNC: 132 MMOL/L (ref 136–145)
WBC NRBC COR # BLD: 9.5 10*3/MM3 (ref 3.4–10.8)

## 2023-09-30 PROCEDURE — 94799 UNLISTED PULMONARY SVC/PX: CPT

## 2023-09-30 PROCEDURE — 83735 ASSAY OF MAGNESIUM: CPT | Performed by: INTERNAL MEDICINE

## 2023-09-30 PROCEDURE — 84100 ASSAY OF PHOSPHORUS: CPT | Performed by: INTERNAL MEDICINE

## 2023-09-30 PROCEDURE — 25010000002 DEXAMETHASONE PER 1 MG: Performed by: NURSE PRACTITIONER

## 2023-09-30 PROCEDURE — 80053 COMPREHEN METABOLIC PANEL: CPT | Performed by: INTERNAL MEDICINE

## 2023-09-30 PROCEDURE — 85025 COMPLETE CBC W/AUTO DIFF WBC: CPT | Performed by: INTERNAL MEDICINE

## 2023-09-30 PROCEDURE — 25010000002 LEVETRIRACETAM PER 10 MG: Performed by: NURSE PRACTITIONER

## 2023-09-30 PROCEDURE — 94640 AIRWAY INHALATION TREATMENT: CPT

## 2023-09-30 PROCEDURE — 94761 N-INVAS EAR/PLS OXIMETRY MLT: CPT

## 2023-09-30 RX ADMIN — BUDESONIDE 0.5 MG: 0.5 INHALANT RESPIRATORY (INHALATION) at 07:30

## 2023-09-30 RX ADMIN — Medication 10 ML: at 03:54

## 2023-09-30 RX ADMIN — LEVETIRACETAM 500 MG: 100 INJECTION, SOLUTION INTRAVENOUS at 08:22

## 2023-09-30 RX ADMIN — Medication 10 ML: at 08:23

## 2023-09-30 RX ADMIN — BUMETANIDE 1 MG: 1 TABLET ORAL at 08:21

## 2023-09-30 RX ADMIN — CARVEDILOL 6.25 MG: 6.25 TABLET, FILM COATED ORAL at 08:22

## 2023-09-30 RX ADMIN — HYDRALAZINE HYDROCHLORIDE 50 MG: 25 TABLET, FILM COATED ORAL at 08:21

## 2023-09-30 RX ADMIN — DEXAMETHASONE SODIUM PHOSPHATE 4 MG: 4 INJECTION, SOLUTION INTRAMUSCULAR; INTRAVENOUS at 08:22

## 2023-09-30 RX ADMIN — FLUOROMETHOLONE 1 DROP: 1 SOLUTION/ DROPS OPHTHALMIC at 08:22

## 2023-09-30 RX ADMIN — IPRATROPIUM BROMIDE AND ALBUTEROL SULFATE 3 ML: .5; 3 SOLUTION RESPIRATORY (INHALATION) at 07:30

## 2023-09-30 RX ADMIN — BRIMONIDINE TARTRATE 1 DROP: 2 SOLUTION/ DROPS OPHTHALMIC at 05:45

## 2023-09-30 RX ADMIN — DEXAMETHASONE SODIUM PHOSPHATE 4 MG: 4 INJECTION, SOLUTION INTRAMUSCULAR; INTRAVENOUS at 03:53

## 2023-09-30 NOTE — CASE MANAGEMENT/SOCIAL WORK
Continued Stay Note  [facility]     Patient Name: Indira Mcgarry  MRN: [id number]  Today's Date: 9/30/2023    Admit Date: 9/28/2023    Plan: return to [facility] no precert or pasrr required.   Discharge Plan       Row Name 09/30/23 1241       Plan    Plan return to [facility] no precert or pasrr required.    Plan Comments spoke with liasion and verified that bed is avail and pt can admit at any time today.  spoke with pt's sister who can provide transport. delivered d/c imm over phone and left signed copy in room.  updated nurse in person. and secure chat sent to md for orders.                      Expected Discharge Date and Time       Expected Discharge Date Expected Discharge Time    Sep 30, 2023               Elvira Menendez RN

## 2023-09-30 NOTE — PROGRESS NOTES
Neurosurgical progress note:    Patient seen and examined yesterday evening.  Extensive discussion was had with the family about treatment options.  As she appears to be relatively asymptomatic from the hemorrhage I believe it is reasonable to not pursue surgical drainage.  I recommend exploration of MMA embolization with the neurosurgeon and Dr. Shabnam Rausch.  Neurosurgery will sign off.  We are available for reconsultation if necessary.

## 2023-09-30 NOTE — CASE MANAGEMENT/SOCIAL WORK
Case Management Discharge Note      Final Note: [facility]    Provided Post Acute Provider List?: N/A  Provided Post Acute Provider Quality & Resource List?: N/A    Selected Continued Care - Discharged on 9/30/2023 Admission date: 9/28/2023 - Discharge disposition: Skilled Nursing Facility (DC - External)      Destination Coordination complete.      Service Provider Selected Services Address Phone Fax Patient Preferred    [facility] [address] [place], [place] [id number] [phone] --                 Transportation Services  Private: Car    Final Discharge Disposition Code: 62 - inpatient rehab facility

## 2023-09-30 NOTE — DISCHARGE SUMMARY
[facility]         DISCHARGE SUMMARY    Patient Name: Indira Mcgarry  : 1934  MRN: [id number]    Date of Admission: 2023  Date of Discharge: 2023  Primary Care Physician: Brandon Lynch MD    Consults       Date and Time Order Name Status Description    2023  2:22 PM Inpatient Hospitalist Consult      2023 12:53 PM Inpatient Neurosurgery Consult              Presenting Problem:   Subdural hematoma [S06.5XAA]  SDH (subdural hematoma) [S06.5XAA]    Active and Resolved Hospital Problems:  Active Hospital Problems    Diagnosis POA    **SDH (subdural hematoma) [S06.5XAA] Yes      Resolved Hospital Problems   No resolved problems to display.         Hospital Course     Hospital Course:  Indira Mcgarry is a 88 y.o. female with PMH of COPD, CHF, with pacemaker who was recently hospitalized after a fall on 2023 in which she was diagnosed with a subdural hematoma measuring 12 mm, was sent to the hospital from her rehab facility today for syncopal episodes.  Patient denies chest pain, shortness of air, palpitations, headache, chills, or fever.  Denies abdominal pain, nausea, vomiting, diarrhea, constipation, loss of weight or loss of appetite, dysuria, blood in urine or stool.  Of note patient has a midline catheter in right upper arm, in which family states was placed at her rehab facility due to being dehydrated and needing fluids.     ED course: CT head showed increase in her right cerebral convexity subdural hematoma, now measuring approximately 20 mm. There was also associated mass effect with right to left midline shift measuring approximately 6 mm, which had also increased since .  Other significant lab work showed BNP 9405, calcium 8.0, albumin 3.1, WBC 11.50, PLT 33.1.    Had a CT scan of the head showed stable subdural collection on the right, residual moderate mass effect remains midline shift approximately 5 mm which is stable, patient was evaluated by  neurosurgeon, who cleared the patient to be discharged back to her skilled nursing facility where she currently resides.  Be discharged later today.      Day of Discharge     Vital Signs:  Temp:  [97.5 °F (36.4 °C)-98.9 °F (37.2 °C)] 97.8 °F (36.6 °C)  Heart Rate:  [50-70] 61  Resp:  [12-18] 17  BP: ()/() 127/48  Flow (L/min):  [2] 2  Physical Exam:  Constitutional: Awake, alert   Eyes: PERRLA, sclerae anicteric, no conjunctival injection   HENT: NCAT, mucous membranes moist   Neck: Supple, no thyromegaly, no lymphadenopathy, trachea midline   Respiratory: Clear to auscultation bilaterally, nonlabored respirations    Cardiovascular: RRR, no murmurs, rubs, or gallops, palpable pedal pulses bilaterally   Gastrointestinal: Positive bowel sounds, soft, nontender, nondistended   Musculoskeletal: No bilateral ankle edema, no clubbing or cyanosis to extremities   Psychiatric: Appropriate affect, cooperative   Neurologic: Oriented x 3, strength symmetric in all extremities, Cranial Nerves grossly intact to confrontation, speech clear   Skin: No rashes     Pertinent  and/or Most Recent Results     LAB RESULTS:      Lab 09/30/23  0648 09/29/23  0506 09/28/23  1722 09/27/23  0450 09/25/23  0430   WBC 9.50 5.80 11.50* 7.60 7.10   HEMOGLOBIN 10.3* 9.9* 10.6* 9.2* 9.9*   HEMATOCRIT 31.9* 30.6* 33.1* 28.9* 30.1*   PLATELETS 220 213 225 195 225   NEUTROS ABS 8.20* 4.70 7.90* 4.80  --    LYMPHS ABS 0.90 0.90 1.90 1.60  --    MONOS ABS 0.40 0.10 1.30* 1.00*  --    EOS ABS 0.00 0.00 0.30 0.20  --    MCV 86.2 86.9 88.4 87.4 86.3   PROTIME  --   --  12.1*  --   --    APTT  --   --  27.8*  --   --          Lab 09/30/23  0648 09/29/23  0506 09/28/23  1722 09/28/23  0440 09/27/23  0450   SODIUM 132* 137 137 137 136   POTASSIUM 4.0 4.8 4.6 4.9 4.6   CHLORIDE 96* 103 102 105 104   CO2 29.0 29.0 25.0 26.0 24.0   ANION GAP 7.0 5.0 10.0 6.0 8.0   BUN 26* 18 20 21 26*   CREATININE 1.00 0.87 0.92 0.93 1.28*   EGFR 54.3* 64.2 60.0*  59.2* 40.4*   GLUCOSE 147* 120* 105* 92 100*   CALCIUM 7.7* 7.9* 8.0* 8.0* 7.5*   MAGNESIUM 1.7 1.8  --   --   --    PHOSPHORUS 3.7 4.0  --   --   --    TSH  --   --  2.550  --   --          Lab 09/30/23  0648 09/29/23  0506 09/28/23  1722   TOTAL PROTEIN 5.1* 5.1* 5.7*   ALBUMIN 2.8* 2.8* 3.1*   GLOBULIN 2.3 2.3 2.6   ALT (SGPT) 7 5 12   AST (SGOT) 9 11 15   BILIRUBIN 0.3 0.5 0.5   ALK PHOS 114 126* 132*         Lab 09/28/23  1722 09/27/23  0450   PROBNP 9,405.0* 8,277.0*   HSTROP T 50*  --    PROTIME 12.1*  --    INR 1.12*  --                  Brief Urine Lab Results  (Last result in the past 365 days)        Color   Clarity   Blood   Leuk Est   Nitrite   Protein   CREAT   Urine HCG        09/25/23 1105 Yellow   Clear   Trace   Negative   Negative   Negative                 Microbiology Results (last 10 days)       Procedure Component Value - Date/Time    MRSA Screen, PCR (Inpatient) - Swab, Nares [[id number]]  (Normal) Collected: 09/28/23 2224    Lab Status: Final result Specimen: Swab from Nares Updated: 09/29/23 0046     MRSA PCR No MRSA Detected    Narrative:      The negative predictive value of this diagnostic test is high and should only be used to consider de-escalating anti-MRSA therapy. A positive result may indicate colonization with MRSA and must be correlated clinically.            CT Head Without Contrast    Result Date: 9/29/2023  Impression: Impression: 1.Stable subdural collection on the right. 2.Residual moderate mass effect. There remains a leftward midline shift of approximately 5 mm which is stable. The basilar cisterns are patent. There is no uncal or cerebellar tonsillar herniation. Electronically Signed: Eric Sanz MD  9/29/2023 7:58 AM EDT  Workstation ID: [id number]    CT Head Without Contrast    Result Date: 9/28/2023  Impression: Right cerebral convexity subdural hematoma is predominantly hypodense with scattered isodense components. This now measures approximately 20 mm in thickness  (previously measured up to 12 mm on 9/11/2023). There is associated mass effect with right to left midline shift measuring approximately 6 mm as measured on series 3, image 27. Midline shift appears slightly increased since 9/11/2023. No findings to suggest uncal/transtentorial herniation. Additional findings compatible with chronic microvascular ischemic change and diffuse cortical atrophy. Electronically Signed: Marco Antonio Vinson MD  9/28/2023 6:15 PM EDT  Workstation ID: [id number]    XR Chest 1 View    Result Date: 9/28/2023  Impression: Impression: Right upper extremity catheter appears to terminate along the right upper chest consistent with a midline catheter. Cardiomegaly. No evidence of acute cardiopulmonary process. Electronically Signed: Bobo Nunez MD  9/28/2023 9:38 PM EDT  Workstation ID: [id number]                 Labs Pending at Discharge:        Discharge Details        Discharge Medications        Continue These Medications        Instructions Start Date   acetaminophen 325 MG tablet  Commonly known as: TYLENOL   650 mg, Oral, Every 6 Hours PRN      albuterol (2.5 MG/3ML) 0.083% nebulizer solution  Commonly known as: PROVENTIL   2.5 mg, Nebulization, Every 6 Hours PRN      alendronate 70 MG tablet  Commonly known as: FOSAMAX   70 mg, Oral, Every 7 Days, Mondays      brimonidine 0.1 % solution ophthalmic solution  Commonly known as: ALPHAGAN P   1 drop, Both Eyes, Every 8 Hours Scheduled      bumetanide 1 MG tablet  Commonly known as: BUMEX   1 mg, Oral, 2 Times Daily      calcium carbonate 500 MG chewable tablet  Commonly known as: TUMS   1 tablet, Oral, 3 Times Daily PRN      carvedilol 3.125 MG tablet  Commonly known as: COREG   3.125 mg, Oral, 2 Times Daily With Meals      Diclofenac Sodium 1 % gel gel  Commonly known as: VOLTAREN   4 g, Topical, 3 Times Daily PRN      diphenhydrAMINE 25 mg capsule  Commonly known as: BENADRYL   12.5 mg, Oral, 3 Times Daily PRN      diphenhydrAMINE-zinc acetate  2-0.1 % cream   1 application , Topical, 2 Times Daily PRN      docusate sodium 100 MG capsule  Commonly known as: COLACE   100 mg, Oral, 2 Times Daily      fluorometholone 0.1 % ophthalmic suspension  Commonly known as: FML   1 drop, Left Eye, Daily      hydrALAZINE 25 MG tablet  Commonly known as: APRESOLINE   50 mg, Oral, 3 Times Daily      lidocaine 5 %  Commonly known as: LIDODERM   1 patch, Transdermal, Every 24 Hours, Remove & Discard patch within 12 hours or as directed by MD      loperamide 2 MG capsule  Commonly known as: IMODIUM   2 mg, Oral, 4 Times Daily PRN      Mag-Al Plus 200-200-20 MG/5ML suspension  Generic drug: aluminum-magnesium hydroxide-simethicone   30 mL, Oral, 3 Times Daily PRN      magnesium hydroxide 2400 MG/10ML suspension suspension  Commonly known as: MILK OF MAGNESIA   30 mL, Oral, Daily PRN      meclizine 25 MG tablet  Commonly known as: ANTIVERT   12.5 mg, Oral, Daily PRN      melatonin 5 MG tablet tablet   5 mg, Oral, Nightly PRN      ondansetron 4 MG tablet  Commonly known as: ZOFRAN   4 mg, Oral, Every 4 Hours PRN      ondansetron 2 mg/mL injection  Commonly known as: ZOFRAN   4 mg, Intravenous, Every 4 Hours PRN      POLYVINYL ALCOHOL-POVIDONE OP   1 drop, Right Eye, 2 Times Daily      simethicone 80 MG chewable tablet  Commonly known as: MYLICON   80 mg, Oral, 4 Times Daily PRN               Allergies   Allergen Reactions    Macrobid [Nitrofurantoin] Hives    Penicillins Hives         Discharge Disposition:   Skilled Nursing Facility (DC - External)    Discharge Condition: .cond    Diet:  Hospital:  Diet Order   Procedures    Diet: Cardiac Diets; Healthy Heart (2-3 Na+); Texture: Regular Texture (IDDSI 7); Fluid Consistency: Thin (IDDSI 0)         Discharge Activity:   Activity Instructions       Activity as Tolerated                CODE STATUS:  Code Status and Medical Interventions:   Ordered at: 09/28/23 1942     Code Status (Patient has no pulse and is not breathing):     CPR (Attempt to Resuscitate)     Medical Interventions (Patient has pulse or is breathing):    Full Support         Future Appointments   Date Time Provider Department Center   10/7/2023  8:00 AM [facility]       Additional Instructions for the Follow-ups that You Need to Schedule       Discharge Follow-up with PCP   As directed       Currently Documented PCP:    Brandon Lynch MD    PCP Phone Number:    [phone]     Follow Up Details: in 3 to 5 days                Time spent on Discharge including face to face service:  30 minutes    Kingsley Smith MD

## 2023-09-30 NOTE — PLAN OF CARE
Goal Outcome Evaluation:      Discharge orders placed for patient by MD. Patient going back to [facility]. IV and midline removed. Awaiting for patients daughter. Okay per MD Smith for patient to be discontinued from cardiac monitoring. [facility] THERESA Stratton called and updated for patients return.

## 2023-10-03 NOTE — PROGRESS NOTES
"Enter Query Response Below      Query Response: Brain compression, not clinically significant              If applicable, please update the problem list.       Patient: Indira Mcgarry        : 1934  Account: [id number]           Admit Date: 2023        How to Respond to this query:       a. Click New Note     b. Answer query within the yellow box.                c. Update the Problem List, if applicable.      If you have any questions about this query contact me at: Sincerely,    Chaim Aguilar RN, BSN  Clinical Documentation Integrity  [facility]  [email or web address]       ,     On 2023, 88 year old female with recent fall presents after syncopal episodes,complaints of headache,  CT findings reporting stable subdural collection (measuring 12mm),  with residual moderate mass effect, leftward midline shift, requiring no surgical intervention per neurovascular surgeon.     -- Progress notes states \" CT head showed increase in her right cerebral convexity subdural hematoma, now measuring approximately 20 mm. There was also associated mass effect with right to left midline shift measuring approximately 6 mm, which had also increased  since .\"    -Treatments- Intravenous Decadron every 6 hrs (-), Keppra every 12 hrs (-).     Please clarify if the patient being treated/monitored for one or more of the following:    Significant brain compression  Brain compression, not clinically significant  Other- specify ___________  Unable to determine       By submitting this query, we are merely seeking further clarification of documentation to accurately reflect all conditions that you are monitoring, evaluating, treating or that extend the hospitalization or utilize additional resources of care. Please utilize your independent clinical judgment when addressing the question(s) above.     This query and your response, once completed, will be entered into the legal " medical record.    Sincerely,  Chaim Aguilar  Clinical Documentation Integrity Program